# Patient Record
Sex: FEMALE | Race: WHITE | NOT HISPANIC OR LATINO | Employment: FULL TIME | URBAN - METROPOLITAN AREA
[De-identification: names, ages, dates, MRNs, and addresses within clinical notes are randomized per-mention and may not be internally consistent; named-entity substitution may affect disease eponyms.]

---

## 2018-01-02 LAB
EXTERNAL ANTIBODY SCREEN: NEGATIVE
EXTERNAL GROUP B STREP ANTIGEN: NORMAL
EXTERNAL GTT 1 HOUR: 107
EXTERNAL HEPATITIS B SURFACE ANTIGEN: NEGATIVE
EXTERNAL HEPATITIS C AB: NORMAL
EXTERNAL RUBELLA QUALITATIVE: NORMAL
EXTERNAL SYPHILIS RPR SCREEN: NORMAL
HIV1 P24 AG SERPL QL IA: NORMAL

## 2018-08-07 ENCOUNTER — ANESTHESIA (OUTPATIENT)
Dept: LABOR AND DELIVERY | Facility: HOSPITAL | Age: 33
End: 2018-08-07

## 2018-08-07 ENCOUNTER — HOSPITAL ENCOUNTER (INPATIENT)
Facility: HOSPITAL | Age: 33
LOS: 3 days | Discharge: HOME | End: 2018-08-10
Attending: OBSTETRICS & GYNECOLOGY | Admitting: OBSTETRICS & GYNECOLOGY

## 2018-08-07 ENCOUNTER — ANESTHESIA EVENT (OUTPATIENT)
Dept: LABOR AND DELIVERY | Facility: HOSPITAL | Age: 33
End: 2018-08-07

## 2018-08-07 PROBLEM — Z34.90 PREGNANCY: Status: ACTIVE | Noted: 2018-08-07

## 2018-08-07 LAB
ABO GROUP BLD: NORMAL
BASOPHILS # BLD AUTO: 0.03 10*3/MM3 (ref 0–0.2)
BASOPHILS NFR BLD AUTO: 0.2 % (ref 0–1.5)
BLD GP AB SCN SERPL QL: POSITIVE
DEPRECATED RDW RBC AUTO: 44.6 FL (ref 37–54)
EOSINOPHIL # BLD AUTO: 0.06 10*3/MM3 (ref 0–0.7)
EOSINOPHIL NFR BLD AUTO: 0.5 % (ref 0.3–6.2)
ERYTHROCYTE [DISTWIDTH] IN BLOOD BY AUTOMATED COUNT: 12.9 % (ref 11.7–13)
HCT VFR BLD AUTO: 37.1 % (ref 35.6–45.5)
HGB BLD-MCNC: 12.4 G/DL (ref 11.9–15.5)
IMM GRANULOCYTES # BLD: 0.16 10*3/MM3 (ref 0–0.03)
IMM GRANULOCYTES NFR BLD: 1.3 % (ref 0–0.5)
LYMPHOCYTES # BLD AUTO: 1.65 10*3/MM3 (ref 0.9–4.8)
LYMPHOCYTES NFR BLD AUTO: 13.3 % (ref 19.6–45.3)
MCH RBC QN AUTO: 32 PG (ref 26.9–32)
MCHC RBC AUTO-ENTMCNC: 33.4 G/DL (ref 32.4–36.3)
MCV RBC AUTO: 95.6 FL (ref 80.5–98.2)
MONOCYTES # BLD AUTO: 1.15 10*3/MM3 (ref 0.2–1.2)
MONOCYTES NFR BLD AUTO: 9.2 % (ref 5–12)
NEUTROPHILS # BLD AUTO: 9.39 10*3/MM3 (ref 1.9–8.1)
NEUTROPHILS NFR BLD AUTO: 75.5 % (ref 42.7–76)
PLATELET # BLD AUTO: 159 10*3/MM3 (ref 140–500)
PMV BLD AUTO: 10.7 FL (ref 6–12)
RBC # BLD AUTO: 3.88 10*6/MM3 (ref 3.9–5.2)
RESIDUAL RHIG DETECTED: NORMAL
RH BLD: NEGATIVE
T&S EXPIRATION DATE: NORMAL
WBC NRBC COR # BLD: 12.44 10*3/MM3 (ref 4.5–10.7)

## 2018-08-07 PROCEDURE — 86901 BLOOD TYPING SEROLOGIC RH(D): CPT | Performed by: OBSTETRICS & GYNECOLOGY

## 2018-08-07 PROCEDURE — 86850 RBC ANTIBODY SCREEN: CPT | Performed by: OBSTETRICS & GYNECOLOGY

## 2018-08-07 PROCEDURE — 86900 BLOOD TYPING SEROLOGIC ABO: CPT | Performed by: OBSTETRICS & GYNECOLOGY

## 2018-08-07 PROCEDURE — 3E033VJ INTRODUCTION OF OTHER HORMONE INTO PERIPHERAL VEIN, PERCUTANEOUS APPROACH: ICD-10-PCS | Performed by: OBSTETRICS & GYNECOLOGY

## 2018-08-07 PROCEDURE — 86870 RBC ANTIBODY IDENTIFICATION: CPT | Performed by: OBSTETRICS & GYNECOLOGY

## 2018-08-07 PROCEDURE — 85025 COMPLETE CBC W/AUTO DIFF WBC: CPT | Performed by: OBSTETRICS & GYNECOLOGY

## 2018-08-07 RX ORDER — ACETAMINOPHEN 650 MG/1
650 SUPPOSITORY RECTAL EVERY 4 HOURS PRN
Status: CANCELLED | OUTPATIENT
Start: 2018-08-07

## 2018-08-07 RX ORDER — MINERAL OIL
OIL (ML) MISCELLANEOUS ONCE
Status: COMPLETED | OUTPATIENT
Start: 2018-08-07 | End: 2018-08-08

## 2018-08-07 RX ORDER — PROMETHAZINE HYDROCHLORIDE 25 MG/1
12.5 TABLET ORAL EVERY 6 HOURS PRN
Status: CANCELLED | OUTPATIENT
Start: 2018-08-07

## 2018-08-07 RX ORDER — OXYCODONE HYDROCHLORIDE AND ACETAMINOPHEN 5; 325 MG/1; MG/1
2 TABLET ORAL EVERY 4 HOURS PRN
Status: DISCONTINUED | OUTPATIENT
Start: 2018-08-07 | End: 2018-08-08 | Stop reason: HOSPADM

## 2018-08-07 RX ORDER — ONDANSETRON 4 MG/1
4 TABLET, FILM COATED ORAL EVERY 6 HOURS PRN
Status: DISCONTINUED | OUTPATIENT
Start: 2018-08-07 | End: 2018-08-08 | Stop reason: HOSPADM

## 2018-08-07 RX ORDER — METHYLERGONOVINE MALEATE 0.2 MG/ML
200 INJECTION INTRAVENOUS ONCE AS NEEDED
Status: CANCELLED | OUTPATIENT
Start: 2018-08-07

## 2018-08-07 RX ORDER — ONDANSETRON 4 MG/1
4 TABLET, FILM COATED ORAL EVERY 6 HOURS PRN
Status: CANCELLED | OUTPATIENT
Start: 2018-08-07

## 2018-08-07 RX ORDER — MINERAL OIL
30 OIL (ML) MISCELLANEOUS AS NEEDED
Status: CANCELLED | OUTPATIENT
Start: 2018-08-07

## 2018-08-07 RX ORDER — ONDANSETRON 4 MG/1
4 TABLET, ORALLY DISINTEGRATING ORAL EVERY 6 HOURS PRN
Status: CANCELLED | OUTPATIENT
Start: 2018-08-07

## 2018-08-07 RX ORDER — ZOLPIDEM TARTRATE 5 MG/1
5 TABLET ORAL NIGHTLY PRN
Status: CANCELLED | OUTPATIENT
Start: 2018-08-07 | End: 2018-08-17

## 2018-08-07 RX ORDER — FOLIC ACID 5 MG
1 CAPSULE ORAL
COMMUNITY
End: 2018-08-10 | Stop reason: HOSPADM

## 2018-08-07 RX ORDER — IBUPROFEN 600 MG/1
600 TABLET ORAL EVERY 6 HOURS PRN
Status: CANCELLED | OUTPATIENT
Start: 2018-08-07

## 2018-08-07 RX ORDER — SODIUM CHLORIDE 0.9 % (FLUSH) 0.9 %
1-10 SYRINGE (ML) INJECTION AS NEEDED
Status: DISCONTINUED | OUTPATIENT
Start: 2018-08-07 | End: 2018-08-08 | Stop reason: HOSPADM

## 2018-08-07 RX ORDER — ZOLPIDEM TARTRATE 5 MG/1
5 TABLET ORAL NIGHTLY PRN
Status: DISCONTINUED | OUTPATIENT
Start: 2018-08-07 | End: 2018-08-08 | Stop reason: HOSPADM

## 2018-08-07 RX ORDER — PRENATAL VIT NO.126/IRON/FOLIC 28MG-0.8MG
1 TABLET ORAL DAILY
COMMUNITY

## 2018-08-07 RX ORDER — ONDANSETRON 2 MG/ML
4 INJECTION INTRAMUSCULAR; INTRAVENOUS EVERY 6 HOURS PRN
Status: DISCONTINUED | OUTPATIENT
Start: 2018-08-07 | End: 2018-08-08 | Stop reason: HOSPADM

## 2018-08-07 RX ORDER — DIPHENHYDRAMINE HYDROCHLORIDE 50 MG/ML
25 INJECTION INTRAMUSCULAR; INTRAVENOUS NIGHTLY PRN
Status: DISCONTINUED | OUTPATIENT
Start: 2018-08-07 | End: 2018-08-08 | Stop reason: HOSPADM

## 2018-08-07 RX ORDER — HYDROMORPHONE HYDROCHLORIDE 1 MG/ML
0.5 INJECTION, SOLUTION INTRAMUSCULAR; INTRAVENOUS; SUBCUTANEOUS
Status: CANCELLED | OUTPATIENT
Start: 2018-08-07 | End: 2018-08-17

## 2018-08-07 RX ORDER — OXYCODONE HYDROCHLORIDE AND ACETAMINOPHEN 5; 325 MG/1; MG/1
1 TABLET ORAL EVERY 4 HOURS PRN
Status: CANCELLED | OUTPATIENT
Start: 2018-08-07 | End: 2018-08-17

## 2018-08-07 RX ORDER — MISOPROSTOL 200 UG/1
800 TABLET ORAL AS NEEDED
Status: CANCELLED | OUTPATIENT
Start: 2018-08-07

## 2018-08-07 RX ORDER — HYDROMORPHONE HYDROCHLORIDE 1 MG/ML
1 INJECTION, SOLUTION INTRAMUSCULAR; INTRAVENOUS; SUBCUTANEOUS
Status: CANCELLED | OUTPATIENT
Start: 2018-08-07 | End: 2018-08-17

## 2018-08-07 RX ORDER — BACLOFEN 20 MG
2 TABLET ORAL
COMMUNITY

## 2018-08-07 RX ORDER — OXYCODONE HYDROCHLORIDE AND ACETAMINOPHEN 5; 325 MG/1; MG/1
1 TABLET ORAL EVERY 4 HOURS PRN
Status: DISCONTINUED | OUTPATIENT
Start: 2018-08-07 | End: 2018-08-08 | Stop reason: HOSPADM

## 2018-08-07 RX ORDER — ONDANSETRON 4 MG/1
4 TABLET, ORALLY DISINTEGRATING ORAL EVERY 6 HOURS PRN
Status: DISCONTINUED | OUTPATIENT
Start: 2018-08-07 | End: 2018-08-08 | Stop reason: HOSPADM

## 2018-08-07 RX ORDER — ACETAMINOPHEN 500 MG
1000 TABLET ORAL EVERY 4 HOURS PRN
Status: CANCELLED | OUTPATIENT
Start: 2018-08-07

## 2018-08-07 RX ORDER — PROMETHAZINE HYDROCHLORIDE 12.5 MG/1
12.5 SUPPOSITORY RECTAL EVERY 6 HOURS PRN
Status: CANCELLED | OUTPATIENT
Start: 2018-08-07

## 2018-08-07 RX ORDER — OXYCODONE HYDROCHLORIDE AND ACETAMINOPHEN 5; 325 MG/1; MG/1
2 TABLET ORAL EVERY 4 HOURS PRN
Status: CANCELLED | OUTPATIENT
Start: 2018-08-07 | End: 2018-08-17

## 2018-08-07 RX ORDER — DIPHENHYDRAMINE HYDROCHLORIDE 50 MG/ML
25 INJECTION INTRAMUSCULAR; INTRAVENOUS NIGHTLY PRN
Status: CANCELLED | OUTPATIENT
Start: 2018-08-07

## 2018-08-07 RX ORDER — DEXTROSE, SODIUM CHLORIDE, SODIUM LACTATE, POTASSIUM CHLORIDE, AND CALCIUM CHLORIDE 5; .6; .31; .03; .02 G/100ML; G/100ML; G/100ML; G/100ML; G/100ML
125 INJECTION, SOLUTION INTRAVENOUS CONTINUOUS
Status: DISCONTINUED | OUTPATIENT
Start: 2018-08-07 | End: 2018-08-07

## 2018-08-07 RX ORDER — SODIUM CHLORIDE, SODIUM LACTATE, POTASSIUM CHLORIDE, CALCIUM CHLORIDE 600; 310; 30; 20 MG/100ML; MG/100ML; MG/100ML; MG/100ML
125 INJECTION, SOLUTION INTRAVENOUS CONTINUOUS
Status: DISCONTINUED | OUTPATIENT
Start: 2018-08-07 | End: 2018-08-10 | Stop reason: HOSPADM

## 2018-08-07 RX ORDER — DEXTROSE, SODIUM CHLORIDE, SODIUM LACTATE, POTASSIUM CHLORIDE, AND CALCIUM CHLORIDE 5; .6; .31; .03; .02 G/100ML; G/100ML; G/100ML; G/100ML; G/100ML
125 INJECTION, SOLUTION INTRAVENOUS CONTINUOUS
Status: DISCONTINUED | OUTPATIENT
Start: 2018-08-08 | End: 2018-08-10 | Stop reason: HOSPADM

## 2018-08-07 RX ORDER — DIPHENHYDRAMINE HCL 25 MG
25 CAPSULE ORAL NIGHTLY PRN
Status: CANCELLED | OUTPATIENT
Start: 2018-08-07

## 2018-08-07 RX ORDER — PROMETHAZINE HYDROCHLORIDE 25 MG/ML
12.5 INJECTION, SOLUTION INTRAMUSCULAR; INTRAVENOUS EVERY 6 HOURS PRN
Status: CANCELLED | OUTPATIENT
Start: 2018-08-07

## 2018-08-07 RX ORDER — FAMOTIDINE 20 MG/1
20 TABLET, FILM COATED ORAL EVERY 12 HOURS PRN
Status: DISCONTINUED | OUTPATIENT
Start: 2018-08-07 | End: 2018-08-08 | Stop reason: HOSPADM

## 2018-08-07 RX ORDER — ONDANSETRON 2 MG/ML
4 INJECTION INTRAMUSCULAR; INTRAVENOUS EVERY 6 HOURS PRN
Status: CANCELLED | OUTPATIENT
Start: 2018-08-07

## 2018-08-07 RX ORDER — DIPHENHYDRAMINE HCL 25 MG
25 CAPSULE ORAL NIGHTLY PRN
Status: DISCONTINUED | OUTPATIENT
Start: 2018-08-07 | End: 2018-08-08 | Stop reason: HOSPADM

## 2018-08-07 RX ORDER — ACETAMINOPHEN 500 MG
1000 TABLET ORAL EVERY 4 HOURS PRN
Status: DISCONTINUED | OUTPATIENT
Start: 2018-08-07 | End: 2018-08-08 | Stop reason: HOSPADM

## 2018-08-07 RX ORDER — ASPIRIN 81 MG/1
81 TABLET ORAL DAILY
COMMUNITY
End: 2018-08-10 | Stop reason: HOSPADM

## 2018-08-07 RX ORDER — FAMOTIDINE 10 MG/ML
20 INJECTION, SOLUTION INTRAVENOUS EVERY 12 HOURS PRN
Status: DISCONTINUED | OUTPATIENT
Start: 2018-08-07 | End: 2018-08-08 | Stop reason: HOSPADM

## 2018-08-07 RX ORDER — NALBUPHINE HCL 10 MG/ML
5 AMPUL (ML) INJECTION
Status: CANCELLED | OUTPATIENT
Start: 2018-08-07

## 2018-08-07 RX ORDER — TERBUTALINE SULFATE 1 MG/ML
0.25 INJECTION, SOLUTION SUBCUTANEOUS AS NEEDED
Status: DISCONTINUED | OUTPATIENT
Start: 2018-08-07 | End: 2018-08-08 | Stop reason: HOSPADM

## 2018-08-07 RX ORDER — LIDOCAINE HYDROCHLORIDE 10 MG/ML
5 INJECTION, SOLUTION EPIDURAL; INFILTRATION; INTRACAUDAL; PERINEURAL AS NEEDED
Status: DISCONTINUED | OUTPATIENT
Start: 2018-08-07 | End: 2018-08-08 | Stop reason: HOSPADM

## 2018-08-07 RX ORDER — CARBOPROST TROMETHAMINE 250 UG/ML
250 INJECTION, SOLUTION INTRAMUSCULAR AS NEEDED
Status: CANCELLED | OUTPATIENT
Start: 2018-08-07

## 2018-08-07 RX ADMIN — SODIUM CHLORIDE, POTASSIUM CHLORIDE, SODIUM LACTATE AND CALCIUM CHLORIDE 125 ML/HR: 600; 310; 30; 20 INJECTION, SOLUTION INTRAVENOUS at 19:55

## 2018-08-07 RX ADMIN — DINOPROSTONE 10 MG: 10 INSERT VAGINAL at 20:25

## 2018-08-08 LAB
ATMOSPHERIC PRESS: 748 MMHG
BASE EXCESS BLDCOA CALC-SCNC: -3.9 MMOL/L
BDY SITE: ABNORMAL
FETAL BLEED: NEGATIVE
HCO3 BLDCOA-SCNC: 22.6 MMOL/L (ref 22–28)
MODALITY: ABNORMAL
NUMBER OF DOSES: NORMAL
PCO2 BLDCOA: 45.2 MMHG
PH BLDCOA: 7.31 PH UNITS (ref 7.18–7.34)
PO2 BLDCOA: <25.2 MMHG (ref 12–26)
SAO2 % BLDCOA: 19.4 % (ref 92–99)

## 2018-08-08 PROCEDURE — 3E0E3GC INTRODUCTION OF OTHER THERAPEUTIC SUBSTANCE INTO PRODUCTS OF CONCEPTION, PERCUTANEOUS APPROACH: ICD-10-PCS | Performed by: OBSTETRICS & GYNECOLOGY

## 2018-08-08 PROCEDURE — 82803 BLOOD GASES ANY COMBINATION: CPT

## 2018-08-08 PROCEDURE — 86901 BLOOD TYPING SEROLOGIC RH(D): CPT | Performed by: OBSTETRICS & GYNECOLOGY

## 2018-08-08 PROCEDURE — 0KQM0ZZ REPAIR PERINEUM MUSCLE, OPEN APPROACH: ICD-10-PCS | Performed by: OBSTETRICS & GYNECOLOGY

## 2018-08-08 PROCEDURE — 25010000002 BUTORPHANOL PER 1 MG: Performed by: OBSTETRICS & GYNECOLOGY

## 2018-08-08 PROCEDURE — 88307 TISSUE EXAM BY PATHOLOGIST: CPT

## 2018-08-08 PROCEDURE — 85461 HEMOGLOBIN FETAL: CPT | Performed by: OBSTETRICS & GYNECOLOGY

## 2018-08-08 PROCEDURE — C1755 CATHETER, INTRASPINAL: HCPCS | Performed by: ANESTHESIOLOGY

## 2018-08-08 PROCEDURE — 86900 BLOOD TYPING SEROLOGIC ABO: CPT | Performed by: OBSTETRICS & GYNECOLOGY

## 2018-08-08 PROCEDURE — 10907ZC DRAINAGE OF AMNIOTIC FLUID, THERAPEUTIC FROM PRODUCTS OF CONCEPTION, VIA NATURAL OR ARTIFICIAL OPENING: ICD-10-PCS | Performed by: OBSTETRICS & GYNECOLOGY

## 2018-08-08 RX ORDER — PROMETHAZINE HYDROCHLORIDE 25 MG/ML
12.5 INJECTION, SOLUTION INTRAMUSCULAR; INTRAVENOUS EVERY 4 HOURS PRN
Status: DISCONTINUED | OUTPATIENT
Start: 2018-08-08 | End: 2018-08-10 | Stop reason: HOSPADM

## 2018-08-08 RX ORDER — EPHEDRINE SULFATE 50 MG/ML
5 INJECTION, SOLUTION INTRAVENOUS AS NEEDED
Status: DISCONTINUED | OUTPATIENT
Start: 2018-08-08 | End: 2018-08-08 | Stop reason: HOSPADM

## 2018-08-08 RX ORDER — PHYTONADIONE 1 MG/.5ML
INJECTION, EMULSION INTRAMUSCULAR; INTRAVENOUS; SUBCUTANEOUS
Status: DISPENSED
Start: 2018-08-08 | End: 2018-08-09

## 2018-08-08 RX ORDER — ONDANSETRON 2 MG/ML
4 INJECTION INTRAMUSCULAR; INTRAVENOUS EVERY 6 HOURS PRN
Status: DISCONTINUED | OUTPATIENT
Start: 2018-08-08 | End: 2018-08-10 | Stop reason: HOSPADM

## 2018-08-08 RX ORDER — OXYTOCIN-SODIUM CHLORIDE 0.9% IV SOLN 30 UNIT/500ML 30-0.9/5 UT/ML-%
2-20 SOLUTION INTRAVENOUS
Status: DISCONTINUED | OUTPATIENT
Start: 2018-08-08 | End: 2018-08-10 | Stop reason: HOSPADM

## 2018-08-08 RX ORDER — PROMETHAZINE HYDROCHLORIDE 25 MG/1
25 TABLET ORAL EVERY 6 HOURS PRN
Status: DISCONTINUED | OUTPATIENT
Start: 2018-08-08 | End: 2018-08-10 | Stop reason: HOSPADM

## 2018-08-08 RX ORDER — LIDOCAINE HYDROCHLORIDE 15 MG/ML
INJECTION, SOLUTION EPIDURAL; INFILTRATION; INTRACAUDAL; PERINEURAL AS NEEDED
Status: DISCONTINUED | OUTPATIENT
Start: 2018-08-08 | End: 2018-08-08 | Stop reason: SURG

## 2018-08-08 RX ORDER — OXYTOCIN-SODIUM CHLORIDE 0.9% IV SOLN 30 UNIT/500ML 30-0.9/5 UT/ML-%
999 SOLUTION INTRAVENOUS ONCE
Status: COMPLETED | OUTPATIENT
Start: 2018-08-08 | End: 2018-08-08

## 2018-08-08 RX ORDER — BUTORPHANOL TARTRATE 1 MG/ML
1 INJECTION, SOLUTION INTRAMUSCULAR; INTRAVENOUS
Status: DISCONTINUED | OUTPATIENT
Start: 2018-08-08 | End: 2018-08-10 | Stop reason: HOSPADM

## 2018-08-08 RX ORDER — ONDANSETRON 2 MG/ML
4 INJECTION INTRAMUSCULAR; INTRAVENOUS ONCE AS NEEDED
Status: DISCONTINUED | OUTPATIENT
Start: 2018-08-08 | End: 2018-08-08 | Stop reason: HOSPADM

## 2018-08-08 RX ORDER — OXYTOCIN-SODIUM CHLORIDE 0.9% IV SOLN 30 UNIT/500ML 30-0.9/5 UT/ML-%
250 SOLUTION INTRAVENOUS CONTINUOUS
Status: ACTIVE | OUTPATIENT
Start: 2018-08-08 | End: 2018-08-08

## 2018-08-08 RX ORDER — BISACODYL 10 MG
10 SUPPOSITORY, RECTAL RECTAL DAILY PRN
Status: DISCONTINUED | OUTPATIENT
Start: 2018-08-09 | End: 2018-08-10 | Stop reason: HOSPADM

## 2018-08-08 RX ORDER — SODIUM CHLORIDE 0.9 % (FLUSH) 0.9 %
1-10 SYRINGE (ML) INJECTION AS NEEDED
Status: DISCONTINUED | OUTPATIENT
Start: 2018-08-08 | End: 2018-08-10 | Stop reason: HOSPADM

## 2018-08-08 RX ORDER — IBUPROFEN 800 MG/1
800 TABLET ORAL EVERY 8 HOURS
Status: DISCONTINUED | OUTPATIENT
Start: 2018-08-09 | End: 2018-08-10 | Stop reason: HOSPADM

## 2018-08-08 RX ORDER — PROMETHAZINE HYDROCHLORIDE 25 MG/ML
12.5 INJECTION, SOLUTION INTRAMUSCULAR; INTRAVENOUS EVERY 6 HOURS PRN
Status: DISCONTINUED | OUTPATIENT
Start: 2018-08-08 | End: 2018-08-10 | Stop reason: HOSPADM

## 2018-08-08 RX ORDER — LANOLIN 100 %
OINTMENT (GRAM) TOPICAL
Status: DISCONTINUED | OUTPATIENT
Start: 2018-08-08 | End: 2018-08-10 | Stop reason: HOSPADM

## 2018-08-08 RX ORDER — OXYCODONE HYDROCHLORIDE AND ACETAMINOPHEN 5; 325 MG/1; MG/1
2 TABLET ORAL
Status: DISCONTINUED | OUTPATIENT
Start: 2018-08-08 | End: 2018-08-10 | Stop reason: HOSPADM

## 2018-08-08 RX ORDER — ZOLPIDEM TARTRATE 5 MG/1
5 TABLET ORAL NIGHTLY PRN
Status: DISCONTINUED | OUTPATIENT
Start: 2018-08-08 | End: 2018-08-10 | Stop reason: HOSPADM

## 2018-08-08 RX ORDER — DIPHENHYDRAMINE HYDROCHLORIDE 50 MG/ML
12.5 INJECTION INTRAMUSCULAR; INTRAVENOUS EVERY 8 HOURS PRN
Status: DISCONTINUED | OUTPATIENT
Start: 2018-08-08 | End: 2018-08-08 | Stop reason: HOSPADM

## 2018-08-08 RX ORDER — DOCUSATE SODIUM 100 MG/1
100 CAPSULE, LIQUID FILLED ORAL 2 TIMES DAILY PRN
Status: DISCONTINUED | OUTPATIENT
Start: 2018-08-08 | End: 2018-08-10 | Stop reason: HOSPADM

## 2018-08-08 RX ORDER — ONDANSETRON 4 MG/1
4 TABLET, FILM COATED ORAL EVERY 6 HOURS PRN
Status: DISCONTINUED | OUTPATIENT
Start: 2018-08-08 | End: 2018-08-10 | Stop reason: HOSPADM

## 2018-08-08 RX ORDER — FAMOTIDINE 10 MG/ML
20 INJECTION, SOLUTION INTRAVENOUS ONCE AS NEEDED
Status: DISCONTINUED | OUTPATIENT
Start: 2018-08-08 | End: 2018-08-08 | Stop reason: HOSPADM

## 2018-08-08 RX ORDER — OXYCODONE HYDROCHLORIDE AND ACETAMINOPHEN 5; 325 MG/1; MG/1
1 TABLET ORAL
Status: DISCONTINUED | OUTPATIENT
Start: 2018-08-08 | End: 2018-08-10 | Stop reason: HOSPADM

## 2018-08-08 RX ORDER — ACETAMINOPHEN 325 MG/1
650 TABLET ORAL EVERY 4 HOURS PRN
Status: DISCONTINUED | OUTPATIENT
Start: 2018-08-08 | End: 2018-08-10 | Stop reason: HOSPADM

## 2018-08-08 RX ORDER — OXYTOCIN-SODIUM CHLORIDE 0.9% IV SOLN 30 UNIT/500ML 30-0.9/5 UT/ML-%
125 SOLUTION INTRAVENOUS CONTINUOUS PRN
Status: COMPLETED | OUTPATIENT
Start: 2018-08-08 | End: 2018-08-08

## 2018-08-08 RX ORDER — ONDANSETRON 4 MG/1
4 TABLET, ORALLY DISINTEGRATING ORAL EVERY 6 HOURS PRN
Status: DISCONTINUED | OUTPATIENT
Start: 2018-08-08 | End: 2018-08-10 | Stop reason: HOSPADM

## 2018-08-08 RX ORDER — SODIUM CHLORIDE 9 MG/ML
100 INJECTION, SOLUTION INTRAVENOUS CONTINUOUS
Status: DISCONTINUED | OUTPATIENT
Start: 2018-08-08 | End: 2018-08-10 | Stop reason: HOSPADM

## 2018-08-08 RX ORDER — PROMETHAZINE HYDROCHLORIDE 25 MG/1
12.5 SUPPOSITORY RECTAL EVERY 6 HOURS PRN
Status: DISCONTINUED | OUTPATIENT
Start: 2018-08-08 | End: 2018-08-10 | Stop reason: HOSPADM

## 2018-08-08 RX ORDER — ERYTHROMYCIN 5 MG/G
OINTMENT OPHTHALMIC
Status: DISPENSED
Start: 2018-08-08 | End: 2018-08-09

## 2018-08-08 RX ADMIN — OXYTOCIN 999 ML/HR: 10 INJECTION, SOLUTION INTRAMUSCULAR; INTRAVENOUS at 18:16

## 2018-08-08 RX ADMIN — LIDOCAINE HYDROCHLORIDE 3 ML: 15 INJECTION, SOLUTION EPIDURAL; INFILTRATION; INTRACAUDAL; PERINEURAL at 10:46

## 2018-08-08 RX ADMIN — BUTORPHANOL TARTRATE 2 MG: 2 INJECTION, SOLUTION INTRAMUSCULAR; INTRAVENOUS at 05:32

## 2018-08-08 RX ADMIN — LIDOCAINE HYDROCHLORIDE 2 ML: 15 INJECTION, SOLUTION EPIDURAL; INFILTRATION; INTRACAUDAL; PERINEURAL at 10:50

## 2018-08-08 RX ADMIN — SODIUM CHLORIDE, POTASSIUM CHLORIDE, SODIUM LACTATE AND CALCIUM CHLORIDE 500 ML: 600; 310; 30; 20 INJECTION, SOLUTION INTRAVENOUS at 20:42

## 2018-08-08 RX ADMIN — SODIUM CHLORIDE, SODIUM LACTATE, POTASSIUM CHLORIDE, CALCIUM CHLORIDE AND DEXTROSE MONOHYDRATE 125 ML/HR: 5; 600; 310; 30; 20 INJECTION, SOLUTION INTRAVENOUS at 03:56

## 2018-08-08 RX ADMIN — Medication: at 18:00

## 2018-08-08 RX ADMIN — ACETAMINOPHEN 650 MG: 325 TABLET, FILM COATED ORAL at 22:24

## 2018-08-08 RX ADMIN — IBUPROFEN 800 MG: 800 TABLET ORAL at 22:24

## 2018-08-08 RX ADMIN — OXYTOCIN 125 ML/HR: 10 INJECTION, SOLUTION INTRAMUSCULAR; INTRAVENOUS at 19:37

## 2018-08-08 RX ADMIN — OXYTOCIN 250 ML/HR: 10 INJECTION, SOLUTION INTRAMUSCULAR; INTRAVENOUS at 18:33

## 2018-08-08 RX ADMIN — SODIUM CHLORIDE, POTASSIUM CHLORIDE, SODIUM LACTATE AND CALCIUM CHLORIDE 125 ML/HR: 600; 310; 30; 20 INJECTION, SOLUTION INTRAVENOUS at 12:50

## 2018-08-08 RX ADMIN — Medication 10 ML/HR: at 10:56

## 2018-08-08 RX ADMIN — SODIUM CHLORIDE, POTASSIUM CHLORIDE, SODIUM LACTATE AND CALCIUM CHLORIDE 1000 ML: 600; 310; 30; 20 INJECTION, SOLUTION INTRAVENOUS at 10:28

## 2018-08-08 RX ADMIN — OXYTOCIN 2 MILLI-UNITS/MIN: 10 INJECTION, SOLUTION INTRAMUSCULAR; INTRAVENOUS at 10:16

## 2018-08-08 RX ADMIN — LIDOCAINE HYDROCHLORIDE 3 ML: 15 INJECTION, SOLUTION EPIDURAL; INFILTRATION; INTRACAUDAL; PERINEURAL at 10:48

## 2018-08-08 NOTE — ANESTHESIA PREPROCEDURE EVALUATION
Anesthesia Evaluation     Patient summary reviewed and Nursing notes reviewed                Airway   Dental      Pulmonary - negative pulmonary ROS   Cardiovascular - negative cardio ROS        Neuro/Psych- negative ROS  GI/Hepatic/Renal/Endo - negative ROS     Musculoskeletal (-) negative ROS    Abdominal    Substance History - negative use     OB/GYN    (+) Pregnant,         Other - negative ROS       ROS/Med Hx Other: scoliosis                Anesthesia Plan    ASA 2     epidural     intravenous induction   Anesthetic plan and risks discussed with patient.

## 2018-08-08 NOTE — PLAN OF CARE
Problem: Patient Care Overview  Goal: Plan of Care Review  Outcome: Ongoing (interventions implemented as appropriate)   18   Coping/Psychosocial   Plan of Care Reviewed With patient   Plan of Care Review   Progress improving   OTHER   Outcome Summary mom had vaginal birth after 2.5 hours of pushing to healthy little girl. 2nd degree laceration, 250 ebl. no pain at the time of shift report. bleeding WDL. Mom able to breastfeed and do AIMEE in recovery     Goal: Individualization and Mutuality  Outcome: Ongoing (interventions implemented as appropriate)    Goal: Discharge Needs Assessment  Outcome: Ongoing (interventions implemented as appropriate)      Problem: Anesthesia/Analgesia, Neuraxial (Obstetrics)  Goal: Signs and Symptoms of Listed Potential Problems Will be Absent, Minimized or Managed (Anesthesia/Analgesia, Neuraxial)  Outcome: Ongoing (interventions implemented as appropriate)   18   Goal/Outcome Evaluation   Problems Assessed (Neuraxial Anesthesia/Analgesia, OB) all   Problems Present (Neuraxial Anesth OB) none       Problem: Postpartum (Vaginal Delivery) (Adult,Obstetrics,Pediatric)  Goal: Signs and Symptoms of Listed Potential Problems Will be Absent, Minimized or Managed (Postpartum)  Outcome: Ongoing (interventions implemented as appropriate)   18   Goal/Outcome Evaluation   Problems Assessed (Postpartum Vaginal Delivery) all   Problems Present (Postpartum Vag Deliv) none       Problem: Fall Risk,  (Adult,Obstetrics,Pediatric)  Goal: Identify Related Risk Factors and Signs and Symptoms  Outcome: Ongoing (interventions implemented as appropriate)   18   Fall Risk,  (Adult,Obstetrics,Pediatric)   Related Risk Factors (Fall Risk, ) significant blood loss;regional anesthesia;prolonged bed rest     Goal: Absence of Maternal Fall  Outcome: Ongoing (interventions implemented as appropriate)   18   Fall Risk,   (Adult,Obstetrics,Pediatric)   Absence of Maternal Fall achieves outcome     Goal: Absence of  Fall/Drop  Outcome: Ongoing (interventions implemented as appropriate)   18   Fall Risk,  (Adult,Obstetrics,Pediatric)   Absence of  Fall/Drop achieves outcome       Problem: Skin Injury Risk (Adult)  Goal: Identify Related Risk Factors and Signs and Symptoms  Outcome: Ongoing (interventions implemented as appropriate)   18   Skin Injury Risk (Adult)   Related Risk Factors (Skin Injury Risk) medication;mobility impaired;moisture     Goal: Skin Health and Integrity  Outcome: Ongoing (interventions implemented as appropriate)   18   Skin Injury Risk (Adult)   Skin Health and Integrity achieves outcome

## 2018-08-08 NOTE — L&D DELIVERY NOTE
[facility]  Vaginal Delivery Note    Delivery    Laura Quinonez33 y.o.  at 39w4d    Induction: Misoprostol;Oxytocin   Induction indication:     Cervical ripenin2018  8:25 PM   Augmentation: Artificial rupture of membranes/amniotomy   Labor onset:2018  9:10 AM   Dilation complete: 2018  2:50 PM   Beginning of second stage: 2018  3:40 PM     Antibiotics received during labor: No            Delivery: Vaginal, Spontaneous Delivery     YOB: 2018    Time of Birth: 6:13 PM      Anesthesia: Epidural     Delivering clinician: Angy Gilbert MD       Infant    Findings: Viable female  infant    Infant observations: Weight: 3471 g (7 lb 10.4 oz)    Observations/Comments:  scale 4      Apgars: 8   @ 1 minute /    9   @ 5 minutes     Placenta, Cord, and Fluid    Placenta delivered  Spontaneous   at  2018  6:16 PM     Cord: 3 vessels  present.   Cord blood obtained: Yes    Cord gases obtained:  Yes      Repair    Episiotomy: none   Lacerations: 2nd   Estimated Blood Loss: 250  mls.       Delivery narrative: The patient is a 33 y.o.  at 39w4d.  Cervidil /pit induction for polyhydramnios. Cervidil for 12 hrs. Membrane rupture/fluid: artificial rupture of membranes  at 9:10 AM  on 2018  Clear - large amount. Pit only to 4 mu max.  She progressed appropriately in labor to c/c/+2.  FHR were overall reassuring without persistent worrisome decelerations. Amnioinfusion for intermittent variables.  SheProgressed to complete at 2:50 PM . Labored down until 2018  3:40 PM . She pushed 2 1/2 hrs and had a   of a  3471 g (7 lb 10.4 oz)  female   infant   8   @ 1 minute /   9   @ 5 minutes. Tight nuchal that was reduced. The left shoulder was the anterior shoulder and easily delivered. Arterial was pH sent.    Placenta was spontaneously delivered,3 vessel cord, intact. . Cervix and rectum intact. There was a  2nd degree laceration repaired in usual fashion with vicryl suture. EBL was  250  mls. There were no complications. Mother and baby doing well at time of dictation.    Placenta to pathology- polyhydramnios    Angy Gilbert MD  08/08/18  9:11 PM    Delivery note completed now-  too soon after delivery that nursing info not yet entered. Refreshing later so missing delivery demographics recorded.

## 2018-08-08 NOTE — PLAN OF CARE
Problem: Patient Care Overview  Goal: Plan of Care Review  Outcome: Ongoing (interventions implemented as appropriate)   08/08/18 1948 08/08/18 1953   Coping/Psychosocial   Plan of Care Reviewed With --  patient;spouse   Plan of Care Review   Progress improving --    OTHER   Outcome Summary mom had vaginal birth after 2.5 hours of pushing to healthy little girl. 2nd degree laceration, 250 ebl. no pain at the time of shift report. bleeding WDL. Mom able to breastfeed and do AIMEE in recovery --      Goal: Individualization and Mutuality  Outcome: Ongoing (interventions implemented as appropriate)   08/08/18 1948   Individualization   Patient Specific Preferences aimee, breastfeed,  to cut cord   Patient Specific Goals (Include Timeframe) healthy mom and baby, pain control   Patient Specific Interventions position changes, pitocin, iupc, amnioinfusion   Mutuality/Individual Preferences   What Anxieties, Fears, Concerns, or Questions Do You Have About Your Care? general anxiety about being 1st time parent   What Information Would Help Us Give You More Personalized Care? none   How Would You and/or Your Support Person Like to Participate in Your Care? none   Mutuality/Individual Preferences   How to Address Anxieties/Fears discuss POC with patien and family     Goal: Discharge Needs Assessment  Outcome: Ongoing (interventions implemented as appropriate)   08/08/18 1953   Discharge Needs Assessment   Readmission Within the Last 30 Days no previous admission in last 30 days   Concerns to be Addressed no discharge needs identified   Patient/Family Anticipates Transition to home with family   Patient/Family Anticipated Services at Transition none   Transportation Concerns car, none   Transportation Anticipated car, drives self   Anticipated Changes Related to Illness none   Equipment Needed After Discharge none   Offered/Gave Vendor List no   Disability   Equipment Currently Used at Home none     Goal: Interprofessional  Rounds/Family Conf  Outcome: Ongoing (interventions implemented as appropriate)   18   Interdisciplinary Rounds/Family Conf   Participants nursing;physician       Problem: Anesthesia/Analgesia, Neuraxial (Obstetrics)  Goal: Signs and Symptoms of Listed Potential Problems Will be Absent, Minimized or Managed (Anesthesia/Analgesia, Neuraxial)  Outcome: Ongoing (interventions implemented as appropriate)   18   Goal/Outcome Evaluation   Problems Assessed (Neuraxial Anesthesia/Analgesia, OB) all   Problems Present (Neuraxial Anesth OB) none       Problem: Postpartum (Vaginal Delivery) (Adult,Obstetrics,Pediatric)  Goal: Signs and Symptoms of Listed Potential Problems Will be Absent, Minimized or Managed (Postpartum)  Outcome: Ongoing (interventions implemented as appropriate)   18   Goal/Outcome Evaluation   Problems Assessed (Postpartum Vaginal Delivery) all   Problems Present (Postpartum Vag Deliv) none       Problem: Fall Risk,  (Adult,Obstetrics,Pediatric)  Goal: Identify Related Risk Factors and Signs and Symptoms  Outcome: Ongoing (interventions implemented as appropriate)   18   Fall Risk,  (Adult,Obstetrics,Pediatric)   Related Risk Factors (Fall Risk, ) medication side effects   Signs and Symptoms (Fall Risk, ) presence of fall risk factors     Goal: Absence of Maternal Fall  Outcome: Ongoing (interventions implemented as appropriate)   18   Fall Risk,  (Adult,Obstetrics,Pediatric)   Absence of Maternal Fall making progress toward outcome     Goal: Absence of  Fall/Drop  Outcome: Ongoing (interventions implemented as appropriate)   18   Fall Risk,  (Adult,Obstetrics,Pediatric)   Absence of [place] Fall/Drop making progress toward outcome       Problem: Skin Injury Risk (Adult)  Goal: Identify Related Risk Factors and Signs and Symptoms  Outcome: Ongoing (interventions implemented as  appropriate)   08/08/18 1953   Skin Injury Risk (Adult)   Related Risk Factors (Skin Injury Risk) medication     Goal: Skin Health and Integrity  Outcome: Ongoing (interventions implemented as appropriate)   08/08/18 1953   Skin Injury Risk (Adult)   Skin Health and Integrity making progress toward outcome

## 2018-08-08 NOTE — H&P
.[facility]  Obstetric History and Physical    Chief Complaint   Patient presents with   • Scheduled Induction     pt rpt +FM; denies LOF, VB, or contractions. pt beinf induced for polyhydramnios       Subjective     Patient is a 33 y.o. female  currently at 39w4d, who is cervidil/ pit induction due to       Polyhydramnios.      Past OB History:       Obstetric History       T0      L0     SAB0   TAB0   Ectopic0   Molar0   Multiple0   Live Births0       # Outcome Date GA Lbr Rian/2nd Weight Sex Delivery Anes PTL Lv   1 Current                   Past Medical History: Past Medical History:   Diagnosis Date   • Abnormal Pap smear of cervix     ascus   • Breast carcinoma (CMS/HCC)     age 21   • MTHFR mutation (CMS/HCC)    • Scoliosis, adolescent acquired     no treatment      Past Surgical History Past Surgical History:   Procedure Laterality Date   • BREAST AUGMENTATION      age 21   • BREAST LUMPECTOMY     • BREAST RECONSTRUCTION      age 21   • MASTECTOMY     • WISDOM TOOTH EXTRACTION        Family History: Family History   Problem Relation Age of Onset   • Diabetes Father    • Osteoporosis Mother       Social History:  reports that she has never smoked. She has never used smokeless tobacco.   reports that she does not drink alcohol.   reports that she does not use drugs.    Allergies:     Patient has no known allergies.       Objective       Vital Signs Range for the last 24 hours  Temperature: Temp:  [98 °F (36.7 °C)-98.4 °F (36.9 °C)] 98.1 °F (36.7 °C)   Temp Source: Temp src: Oral   BP: BP: ()/(50-82) 130/82   Pulse: Heart Rate:  [65-96] 69   Respirations: Resp:  [18] 18                   Physical Examination:     General :  Alert in NAD  Abdomen: Gravid, nontender        Cervix: Exam by: Method: sterile exam per physician   Dilation: Cervical Dilation (cm): 2   Effacement: Cervical Effacement: 60%   Station: Fetal Station: -2       Fetal Heart Rate Assessment   Method: Fetal HR  Assessment Method: external   Beats/min: Fetal HR (beats/min): 125   Baseline: Fetal Heart Baseline Rate: normal range   Varibility: Fetal HR Variability: moderate (amplitude range 6 to 25 bpm) (min to mod)   Accels: Fetal HR Accelerations: greater than/equal to 15 bpm, lasting at least 15 seconds   Decels: Fetal HR Decelerations: absent   Tracing Category:       Uterine Assessment   Method: Method: external tocotransducer, palpation   Frequency (min): Contraction Frequency (Minutes): 1-3   Ctx Count in 10 min:     Duration:     Intensity: Contraction Intensity: mild by palpation   Intensity by IUPC:     Resting Tone: Uterine Resting Tone: soft by palpation   Resting Tone by IUPC:     Roberta Units:         S/p cervidil for 12 hrs. Removed.    cvx 2/660/-2/mid -- AROM - large amt clear fluid      Assessment/Plan       Assessment:  1.  Intrauterine pregnancy at 39w4d weeks gestation with reassuring fetal status.    2.  Cervidil/pit induction. polyhydramnios    Plan:   Plan of care has been reviewed with patient and family,.   All questions answered.          Office prenatal reviewed        Angy Gilbert MD  8/8/2018  9:37 AM

## 2018-08-08 NOTE — ANESTHESIA PROCEDURE NOTES
Labor Epidural    Patient location during procedure: OB  Performed By  Anesthesiologist: ALBERTINA CRAIN  Preanesthetic Checklist  Completed: patient identified, site marked, surgical consent, pre-op evaluation, timeout performed, IV checked, risks and benefits discussed and monitors and equipment checked  Prep:  Pt Position:sitting  Sterile Tech:cap, gloves, mask and sterile barrier  Prep:chlorhexidine gluconate and isopropyl alcohol  Monitoring:blood pressure monitoring, continuous pulse oximetry and EKG  Epidural Block Procedure:  Approach:midline  Guidance:landmark technique and palpation technique  Location:L3-L4  Needle Type:Tuohy  Needle Gauge:17  Loss of Resistance Medium: air  Cath Depth at skin:5 cm  Paresthesia: none  Aspiration:negative  Test Dose:negative  Number of Attempts: 1  Post Assessment:  Dressing:occlusive dressing applied and secured with tape  Pt Tolerance:patient tolerated the procedure well with no apparent complications  Complications:no

## 2018-08-09 LAB
ABO GROUP BLD: NORMAL
BASOPHILS # BLD AUTO: 0.02 10*3/MM3 (ref 0–0.2)
BASOPHILS NFR BLD AUTO: 0.1 % (ref 0–1.5)
DEPRECATED RDW RBC AUTO: 44.8 FL (ref 37–54)
EOSINOPHIL # BLD AUTO: 0.05 10*3/MM3 (ref 0–0.7)
EOSINOPHIL NFR BLD AUTO: 0.2 % (ref 0.3–6.2)
ERYTHROCYTE [DISTWIDTH] IN BLOOD BY AUTOMATED COUNT: 13.1 % (ref 11.7–13)
HCT VFR BLD AUTO: 29.1 % (ref 35.6–45.5)
HGB BLD-MCNC: 10 G/DL (ref 11.9–15.5)
IMM GRANULOCYTES # BLD: 0.17 10*3/MM3 (ref 0–0.03)
IMM GRANULOCYTES NFR BLD: 0.8 % (ref 0–0.5)
LYMPHOCYTES # BLD AUTO: 1.53 10*3/MM3 (ref 0.9–4.8)
LYMPHOCYTES NFR BLD AUTO: 7 % (ref 19.6–45.3)
MCH RBC QN AUTO: 32.7 PG (ref 26.9–32)
MCHC RBC AUTO-ENTMCNC: 34.4 G/DL (ref 32.4–36.3)
MCV RBC AUTO: 95.1 FL (ref 80.5–98.2)
MONOCYTES # BLD AUTO: 1.48 10*3/MM3 (ref 0.2–1.2)
MONOCYTES NFR BLD AUTO: 6.8 % (ref 5–12)
NEUTROPHILS # BLD AUTO: 18.83 10*3/MM3 (ref 1.9–8.1)
NEUTROPHILS NFR BLD AUTO: 85.9 % (ref 42.7–76)
PLATELET # BLD AUTO: 133 10*3/MM3 (ref 140–500)
PMV BLD AUTO: 10.3 FL (ref 6–12)
RBC # BLD AUTO: 3.06 10*6/MM3 (ref 3.9–5.2)
RH BLD: NEGATIVE
WBC NRBC COR # BLD: 21.91 10*3/MM3 (ref 4.5–10.7)

## 2018-08-09 PROCEDURE — 25010000002 RHO D IMMUNE GLOBULIN 1500 UNIT/2ML SOLUTION PREFILLED SYRINGE: Performed by: OBSTETRICS & GYNECOLOGY

## 2018-08-09 PROCEDURE — 85025 COMPLETE CBC W/AUTO DIFF WBC: CPT | Performed by: OBSTETRICS & GYNECOLOGY

## 2018-08-09 RX ADMIN — DOCUSATE SODIUM 100 MG: 100 CAPSULE, LIQUID FILLED ORAL at 08:16

## 2018-08-09 RX ADMIN — HUMAN RHO(D) IMMUNE GLOBULIN 1500 UNITS: 1500 SOLUTION INTRAMUSCULAR; INTRAVENOUS at 02:21

## 2018-08-09 RX ADMIN — DOCUSATE SODIUM 100 MG: 100 CAPSULE, LIQUID FILLED ORAL at 19:49

## 2018-08-09 RX ADMIN — IBUPROFEN 800 MG: 800 TABLET ORAL at 23:48

## 2018-08-09 RX ADMIN — Medication: at 02:30

## 2018-08-09 RX ADMIN — IBUPROFEN 800 MG: 800 TABLET ORAL at 16:10

## 2018-08-09 RX ADMIN — IBUPROFEN 800 MG: 800 TABLET ORAL at 08:16

## 2018-08-09 NOTE — LACTATION NOTE
P1. Hx of breast cancer and mastectomy on right. Left breast has augmentation under the muscle. Initiated HGP for early establishment of milk supply on left breast. Baby has latched briefly but there was polyhydramnious as well as uterine infusion during labor so she is very spitty.   Has LC number    Lactation Consult Note    Evaluation Completed: 2018 12:45 PM  Patient Name: Laura Quinonez  :  1985  MRN:  [id number]     REFERRAL  INFORMATION:                          Date of Referral: 18   Person Making Referral: nurse  Maternal Reason for Referral: breastfeeding currently, breast surgery/injury history  Infant Reason for Referral: other (see comments) (spitty)    DELIVERY HISTORY:          Skin to skin initiation date/time: 2018  6:16 PM   Skin to skin end date/time:              MATERNAL ASSESSMENT:     Breast Shape: Left:, round (18 1240 : Kadie Longoria RN)  Breast Density: soft (18 1240 : Kadie Longoria, RN)  Areola: surgical scarring (18 1240 : Kadie Longoria, RN)  Nipples: graspable (18 1240 : Kadie Longoria, RN)  Nipple Width: 1.5 cm (18 1240 : Kadie Longoria, RN)             INFANT ASSESSMENT:  Information for the patient's :  Kimberly Quinonez [[id number]]   No past medical history on file.                                                                                                                                MATERNAL INFANT FEEDING:  Maternal Preparation: breast care (18 1240 : Kadie Longoria RN)  Maternal Emotional State: assist needed (18 1240 : Kadie Longoria, RN)                     Milk Ejection Reflex: present (18 1240 : Kadie Longoria, RN)                                           EQUIPMENT TYPE:  Breast Pump Type: double electric, hospital grade (18 1240 : Kadie Longoria, RN)  Breast Pump Flange Type: hard (18 1240 : Kadie Longoria, RN)  Breast  Pump Flange Size: 24 mm (08/09/18 1240 : Kadie Longoria, RN)                        BREAST PUMPING:  Breast Pumping Interventions: early pumping promoted, frequent pumping encouraged (08/09/18 1240 : Kadie Longoria, RN)  Breast Pumping: left breast pumped until soft, single electric breast pump utilized, other (see comments) (mastectomy on right side) (08/09/18 1240 : Kadie Longoria, RN)    LACTATION REFERRALS:  Lactation Referrals: outpatient lactation program (08/09/18 1240 : Kadie Longoria, RN)

## 2018-08-09 NOTE — LACTATION NOTE
Lactation Consult Note  Discussed how to obtain and assisted with deep latch on left breast in cross cradle.  Evaluation Completed: 2018 6:53 PM  Patient Name: Laura Quinonez  :  1985  MRN:  [id number]     REFERRAL  INFORMATION:                          Date of Referral: 18   Person Making Referral: patient  Maternal Reason for Referral: breastfeeding currently, breast surgery/injury history  Infant Reason for Referral: other (see comments) (spitty)    DELIVERY HISTORY:          Skin to skin initiation date/time: 2018  6:16 PM   Skin to skin end date/time:              MATERNAL ASSESSMENT:     Breast Shape: Left:, round (18 : Oksana Arredondo RN)  Breast Density: soft (18 : Oksana Arredondo RN)  Areola: surgical scarring (18 : Oksana Arredondo RN)  Nipples: graspable (18 : Oksana Arredondo RN)                INFANT ASSESSMENT:  Information for the patient's :  Kimberly Quinonez [[id number]]   No past medical history on file.    Feeding Readiness Cues: energy for feeding (18 : Oksana Arredondo RN)   Feeding Method: breastfeeding (18 : Oksana Arredondo RN)   Feeding Tolerance/Success: arousal required, coordinated suck, coordinated swallow (18 : Oksana Arredondo RN)                   Feeding Interventions: latch assistance provided (18 : Oksana Arredondo RN)                       Infant Positioning: cross-cradle (18 : Oksana Arredondo RN)           Effective Latch During Feeding: yes (18 : Oksana Arredondo RN)   Suck/Swallow Coordination: present (18 : Oksana Arredondo RN)   Signs of Milk Transfer: infant jaw motion present, suck/swallow ratio (18 : Oksana Arredondo RN)       Latch: 2-->grasps breast, tongue down, lips flanged, rhythmic sucking (18 : Oksana Arredondo RN)    Audible Swallowin-->a few with stimulation (18 : Oksana Arredondo RN)   Type of Nipple: 2-->everted (after stimulation) (18 : Oksana Arredondo RN)   Comfort (Breast/Nipple): 2-->soft/nontender (18 : Oksana Arredondo RN)   Hold (Positioning): 1-->minimal assist, teach one side, mother does other, staff holds (18 : Oksana Arredondo RN)   Score: 8 (18 : Oksana Arredondo RN)     Infant-Driven Feeding Scales - Readiness: Alert once handled. Some rooting or takes pacifier. Adequate tone. (18 : Oksana Arredondo RN)   Infant-Driven Feeding Scales - Quality: Nipples with a strong coordinated SSB throughout feed. (18 : Oksana Arredondo RN)             MATERNAL INFANT FEEDING:        Infant Positioning: cross-cradle (18 : Oksana Arredondo RN)   Signs of Milk Transfer: infant jaw motion present, suck/swallow ratio (18 : Oksana Arredondo RN)  Pain with Feeding: no (18 : Oksana Arredondo RN)                       Latch Assistance: yes (18 : Oksana Arredondo RN)                               EQUIPMENT TYPE:  Breast Pump Type: double electric, hospital grade (18 : Oksana Arredondo RN)                              BREAST PUMPING:          LACTATION REFERRALS:

## 2018-08-09 NOTE — PROGRESS NOTES
Comfortable with epidural    cvx- 6-7/90/0      toco- iupc placed. Pit turned off due to tachysystole.    fht- reactive, good stephanie. occ late but overall fine.    A/p - active labor.  
[facility]  Vaginal Delivery Progress Note    Patient Name: Laura Quinonez  :  1985  MRN:  [id number]      Subjective   Postpartum Day 1: Vaginal Delivery of a female infant.     The patient feels well without complaints.  Her pain is well controlled.  Reports normal lochia.     The patient plans to breastfeed.    Objective     Vital Signs Range for the last 24 hours  Temperature: Temp:  [97.1 °F (36.2 °C)-101 °F (38.3 °C)] 97.1 °F (36.2 °C)       BP: BP: ()/(45-80) 103/73   Pulse: Heart Rate:  [] 85   Respirations: Resp:  [16-18] 16                       Physical Exam:  General: Awake and alert  Abdomen: Fundus: firm, non tender, 1 below umbilicus  Extremities:  trace edema, NT     Labs:     Lab Results   Component Value Date    WBC 21.91 (H) 2018    HGB 10.0 (L) 2018    HCT 29.1 (L) 2018    MCV 95.1 2018     (L) 2018     Prenatal labs results reviewed:  Yes   Rubella:  immune  Rh Status:    RH type   Date Value Ref Range Status   2018 Negative  Final         Assessment/Plan  : 1. PPD1 S/P  - Doing well, continue usual cares.         Active Problems:    Pregnancy          TRAE Noriega  2018  9:28 AM     Patient seen and examined. Agree with above assessment.   Sarah Beth Arroyo MD  2018  2:45 PM      
35.1

## 2018-08-10 VITALS
HEART RATE: 73 BPM | BODY MASS INDEX: 26.99 KG/M2 | TEMPERATURE: 97.5 F | HEIGHT: 65 IN | DIASTOLIC BLOOD PRESSURE: 73 MMHG | RESPIRATION RATE: 16 BRPM | OXYGEN SATURATION: 98 % | WEIGHT: 162 LBS | SYSTOLIC BLOOD PRESSURE: 112 MMHG

## 2018-08-10 LAB
BASOPHILS # BLD AUTO: 0.03 10*3/MM3 (ref 0–0.2)
BASOPHILS NFR BLD AUTO: 0.2 % (ref 0–1.5)
DEPRECATED RDW RBC AUTO: 46.2 FL (ref 37–54)
EOSINOPHIL # BLD AUTO: 0.14 10*3/MM3 (ref 0–0.7)
EOSINOPHIL NFR BLD AUTO: 0.9 % (ref 0.3–6.2)
ERYTHROCYTE [DISTWIDTH] IN BLOOD BY AUTOMATED COUNT: 13.3 % (ref 11.7–13)
HCT VFR BLD AUTO: 29.6 % (ref 35.6–45.5)
HGB BLD-MCNC: 9.7 G/DL (ref 11.9–15.5)
IMM GRANULOCYTES # BLD: 0.14 10*3/MM3 (ref 0–0.03)
IMM GRANULOCYTES NFR BLD: 0.9 % (ref 0–0.5)
LYMPHOCYTES # BLD AUTO: 1.93 10*3/MM3 (ref 0.9–4.8)
LYMPHOCYTES NFR BLD AUTO: 12.4 % (ref 19.6–45.3)
MCH RBC QN AUTO: 31.8 PG (ref 26.9–32)
MCHC RBC AUTO-ENTMCNC: 32.8 G/DL (ref 32.4–36.3)
MCV RBC AUTO: 97 FL (ref 80.5–98.2)
MONOCYTES # BLD AUTO: 1.24 10*3/MM3 (ref 0.2–1.2)
MONOCYTES NFR BLD AUTO: 8 % (ref 5–12)
NEUTROPHILS # BLD AUTO: 12.06 10*3/MM3 (ref 1.9–8.1)
NEUTROPHILS NFR BLD AUTO: 77.6 % (ref 42.7–76)
PLATELET # BLD AUTO: 135 10*3/MM3 (ref 140–500)
PMV BLD AUTO: 10.4 FL (ref 6–12)
RBC # BLD AUTO: 3.05 10*6/MM3 (ref 3.9–5.2)
WBC NRBC COR # BLD: 15.54 10*3/MM3 (ref 4.5–10.7)

## 2018-08-10 PROCEDURE — 85025 COMPLETE CBC W/AUTO DIFF WBC: CPT | Performed by: PHYSICIAN ASSISTANT

## 2018-08-10 RX ORDER — IBUPROFEN 800 MG/1
800 TABLET ORAL EVERY 8 HOURS PRN
Qty: 30 TABLET | Refills: 0 | Status: SHIPPED | OUTPATIENT
Start: 2018-08-10

## 2018-08-10 RX ADMIN — IBUPROFEN 800 MG: 800 TABLET ORAL at 09:16

## 2018-08-10 NOTE — LACTATION NOTE
Pt to follow up with LC tomorrow for weight check.  Pt to call LC when on the way to hospital.  Wt to be reported to hospitals.  Pt will continue to offer breast and feed back all EBM after pumping.  Pt to start supplements if infant does not latch until pumping volumes increase.

## 2018-08-10 NOTE — LACTATION NOTE
P1. Mom reports baby nursed well yesterday but sleepy during night. Baby is getting assessment now and awake so mom will put her to breast. Mom knows about hand expression and she has been using hgp. She has a personal pump at home. Mom denies questions at this time. Gave \[facility]\"" card if needing f/u  Lactation Consult Note    Evaluation Completed: 8/10/2018 9:36 AM  Patient Name: Laura Quinonez  :  1985  MRN:  [id number]     REFERRAL  INFORMATION:                          Date of Referral: 18   Person Making Referral: patient  Maternal Reason for Referral: breastfeeding currently, breast surgery/injury history  Infant Reason for Referral: other (see comments) (spitty)    DELIVERY HISTORY:          Skin to skin initiation date/time: 2018  6:16 PM   Skin to skin end date/time:              MATERNAL ASSESSMENT:                               INFANT ASSESSMENT:  Information for the patient's :  Kimberly Quinonez [[id number]]   No past medical history on file.                                                                                                                                MATERNAL INFANT FEEDING:                                                                       EQUIPMENT TYPE:                                 BREAST PUMPING:          LACTATION REFERRALS:

## 2018-08-10 NOTE — DISCHARGE SUMMARY
Date of Discharge:  8/10/2018    Discharge Diagnosis: vaginal delivery    Presenting Problem/History of Present Illness  Pregnancy [Z34.90]       Hospital Course  Patient is a 33 y.o. female presented with labor. Had uncomplicated vaginal delivery. Postpartum course unremarkable.      Procedures Performed         Consults:   Consults     Date and Time Order Name Status Description    2018 1926 Inpatient consult to Anesthesiology            Condition on Discharge:   Subjective   Postpartum Day 2 Vaginal Delivery.    The patient feels well without complaints.    Vital Signs  Temp:  [97.4 °F (36.3 °C)-98 °F (36.7 °C)] 97.5 °F (36.4 °C)  Heart Rate:  [] 73  Resp:  [16] 16  BP: (112-117)/(70-73) 112/73    Physical Exam:   General: Awake and alert   Abdomen: Fundus: firm, non tender    Extremities:  Calves NT bilaterally    Assessment/Plan     PPD2  S/P  -   Stable for discharge. Instructions reviewed      Discharge Disposition  Home or Self Care    Discharge Medications     Discharge Medications      New Medications      Instructions Start Date   ibuprofen 800 MG tablet  Commonly known as:  ADVIL,MOTRIN   800 mg, Oral, Every 8 Hours PRN         Continue These Medications      Instructions Start Date   CALTRATE 600+D 600-800 MG-UNIT tablet  Generic drug:  calcium carb-cholecalciferol   1 tablet, Oral      DHA Omega 3 100 MG capsule   2 tablet/day, Oral      prenatal (CLASSIC) vitamin 28-0.8 MG tablet tablet   1 tablet, Oral, Daily         Stop These Medications    aspirin 81 MG EC tablet     Folic Acid 5 MG capsule              The patient has been prescribed a controlled substance.  She has been counseled on the risks associated with using the medication.   The addictive potential of this medication and alternatives were discussed carefully with this patient and she demonstrated understanding.  A TONY report has been obtained and reviewed.       Activity at Discharge:     Follow-up Appointments  No  future appointments.      Test Results Pending at Discharge       Sadaf Mccabe MD  08/10/18  9:41 AM

## 2021-04-16 ENCOUNTER — BULK ORDERING (OUTPATIENT)
Dept: CASE MANAGEMENT | Facility: OTHER | Age: 36
End: 2021-04-16

## 2021-04-16 DIAGNOSIS — Z23 IMMUNIZATION DUE: ICD-10-CM

## 2023-05-01 ENCOUNTER — TRANSCRIBE ORDERS (OUTPATIENT)
Dept: ULTRASOUND IMAGING | Facility: HOSPITAL | Age: 38
End: 2023-05-01
Payer: COMMERCIAL

## 2023-05-01 DIAGNOSIS — O09.812 SUPRVSN OF PREG RSLT FROM ASSIST REPRODCTV TECH, SECOND TRI: Primary | ICD-10-CM

## 2023-08-07 ENCOUNTER — TRANSCRIBE ORDERS (OUTPATIENT)
Dept: ULTRASOUND IMAGING | Facility: HOSPITAL | Age: 38
End: 2023-08-07
Payer: COMMERCIAL

## 2023-08-07 DIAGNOSIS — O09.529 ANTEPARTUM MULTIGRAVIDA OF ADVANCED MATERNAL AGE: Primary | ICD-10-CM

## 2023-08-08 NOTE — PROGRESS NOTES
MATERNAL FETAL MEDICINE Consult Note    Dear Dr Angy Gilbert MD:    Thank you for your kind referral of Laura Quinonez.  As you know, she is a 38 y.o.   at  26 5/7 weeks gestation (Estimated Date of Delivery: None noted.). This is a consult.      Her antepartum course is complicated by:  Complete previa, possible placenta accreta    Aneuploidy Screening: low risk cell free DNA    HPI: Today, she denies headache, blurry vision, RUQ pain. No vaginal bleeding, no contractions.     Review of History:  Past Medical History:   Diagnosis Date    Abnormal Pap smear of cervix     ascus    Breast carcinoma     age 21    Hemochromatosis     MTHFR mutation     Scoliosis, adolescent acquired     no treatment     Past Surgical History:   Procedure Laterality Date    BREAST AUGMENTATION      age 21    BREAST LUMPECTOMY      BREAST RECONSTRUCTION      age 21    DILATATION AND CURETTAGE      MASTECTOMY      WISDOM TOOTH EXTRACTION         Social History     Socioeconomic History    Marital status:    Tobacco Use    Smoking status: Never     Passive exposure: Never    Smokeless tobacco: Never   Vaping Use    Vaping Use: Never used   Substance and Sexual Activity    Alcohol use: No    Drug use: No    Sexual activity: Yes     Partners: Male     Family History   Problem Relation Age of Onset    Diabetes Father     Osteoporosis Mother       No Known Allergies   Current Outpatient Medications on File Prior to Visit   Medication Sig Dispense Refill    aspirin 81 MG EC tablet Take 1 tablet by mouth Daily.      Docosahexaenoic Acid (DHA OMEGA 3) 100 MG capsule Take 2 tablet/day by mouth.      folic acid (FOLVITE) 400 MCG tablet Take 1 tablet by mouth Daily.      Prenatal Vit-Fe Fumarate-FA (PRENATAL, CLASSIC, VITAMIN) 28-0.8 MG tablet tablet Take 1 tablet by mouth Daily.       No current facility-administered medications on file prior to visit.        Past obstetric, gynecological, medical, surgical, family and social  "history reviewed.  Relevant lab work and imaging reviewed.    Review of systems  Constitutional:  denies fever, chills, malaise.   ENT/Mouth:  denies sore throat, tinnitis  Eyes: denies vision changes/pain  CV:  denies chest pain  Respiratory:  denies cough/SOB  GI:  denies N/V, diarrhea, abdominal pain.    :   denies dysuria  Skin:  denies lesions or pruritis   Neuro:  denies weakness, focal neurologic symptoms    Vitals:    23 1042   BP: 112/68   BP Location: Right arm   Patient Position: Sitting   Cuff Size: Adult   Pulse: 95   Temp: 98 øF (36.7 øC)   Weight: 74.2 kg (163 lb 8 oz)   Height: 162.6 cm (64\")         PHYSICAL EXAM   GENERAL: Not in acute distress, AAOx3, pleasant  CARDIO: regular rate and rhythm  PULM: symmetric chest rise, speaking in complete sentences without difficulty  NEURO: awake, alert and oriented to person, place, and time  ABDOMINAL: No fundal tenderness, no rebound or guarding, gravid  EXTREMITIES: no bilateral lower extremity edema/tenderness  SKIN: Warm, well-perfused      ULTRASOUND   Please view full ultrasound note on Imaging tab in ViewPoint.  Breech presentation.  Complete previa with prominent appearing maternal vasculature concerning for possible accreta.  JENNI 23 cm, which is normal.  EFW 1159 g (71%, AC 90%)  Cervical length 3.2 cm, which is normal.     ASSESSMENT/COUNSELIN y.o.   at  26 5/7 weeks gestation (Estimated Date of Delivery: None noted.).     -Pregnancy  [ X ] stable  [   ] improving [  ] worsening    Diagnoses and all orders for this visit:    1. Antepartum multigravida of advanced maternal age (Primary)    2. [place] product of IVF pregnancy    3. Placenta previa antepartum           IVF Pregnancy:  Previously counseled: This patient's pregnancy was conceived via IVF.  We discussed that the risk of birth defects in pregnancies conceived through IVF or ICSI is slightly higher than that for naturally conceived pregnancies. The risk for birth " defects for IVF and ICSI pregnancies has ranged from 5-8%.   The most common birth defects in pregnanices conceived with IVF-ICSI are cardiac malformations and genitourinary malformations.  The risk of birth defects from autologous and donor oocytes does not differ.    There appears to be a small increased risk of uteroplacental insufficiency (FGR,  Pre-eclampsia) and abnormal placentation (Accreta spectrum) in IVF pregnancies.  The risk of gestational diabetes is also increased slightly.     Thus, a fetal ECHO is indicated to further evaluate for cardiac malformation.  Serial growth exams are indicated.      Advanced Maternal Age  Previously counseled: Noninvasive prenatal screening with cell-free fetal DNA (NIPT) results reviewed, which were normal.    Advanced maternal age has been associated with placental insufficiency with increased risk of fetal growth disorder and hypertensive disorders. Recommend fetal growth surveillance. Start  fetal surveillance with weekly BPP at 32 weeks.     Recommend baby ASA, which she is taking.      Placenta previa/possible accreta:     Previous counseling: A morbidly adherent placenta is a general term that includes placenta accreta, increta, and percreta.  The most important risk factor for placenta accreta is placenta previa after a prior  delivery--she had a previous vaginal delivery but has had 2 D&C's and an IVF pregnancy. The abnormally implanted placenta is often diagnosed on prenatal sonographic evaluation of the placenta in a woman with risk factors for accreta (previa, previous  delivery), but may be an incidental finding.      Signs of placenta accreta include: uterine bulge, bladder wall interruption, focal exophytic mass, asymmetric thickening of the placenta, loss of hypotintense interface, myometrial thinning, abnormal vascularization, placental lakes, etc.  She has many of these on ultrasound today with a complete previa and  hypervaculature.  I suspect an accreta, but it is always difficult to tell for sure invasion on US.        I strongly suspect an placenta accreta spectrum disorder today.  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  She understands that this diagnosis necessitates hysterectomy and she is at risk for hemorrhage, needing blood transfusions, ICU stay, etc.  She understands all of this very well and understands that when it is found and managed outcomes are great.      She has had a consult at [facility] where they are much less concerned about a placenta accreta.  I see a lot of prominent maternal vasculature that does not necessarily appear normal.  I think the next best step is an MRI, which she has scheduled.  I discussed this with her--the dilemma of needing to make sure we keep her safe but also make sure we are not overcalling it/destining her to a hysterectomy unnecessarily.  I think we should get the MRI then sit down and discuss next steps.  If MRI is not concerning for accreta, I think we should engage gyn onc here and plan for a 35-36 week delivery for placenta previa possible accreta in the main OR. We discussed sometimes when it is unclear/there are differing opinions, the best option is to see what the placenta does after delivery.  If we do this, I would recommend a course of steroids leading up if the pt does not have gestational diabetes dx at that time.  She is amenable to this plan.  We will bring her back in the 30th week for care planning and discussion in coordination with Dr. Gilbert, as well.      She asked about being off work.  Discussed pelvic rest, lifting precautions, etc.  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  Discussed up to her and her stress level at work--she could even consider a hybrid situation and do a couple days a week at work but work at home some of the time--she will  think about this.      She had a normal fetal ECHo at [facility] it sounds like.       Summary of Plan  -Pt going to [facility] for MRI placenta  -Scheduled follow up growth to discuss delivery planning after MRI--likely will engage gyn onc and recommend a planned delivery at 35-36 weeks for previa possible accreta and see what the placenta does after delivery.    -Starting at 32 weeks: Weekly fetal  surveillance until delivery   -If accreta confirmed, will discuss delivery location.      Follow-up: 3-4 weeks care planning, look again at placenta    Thank you for the consult and opportunity to care for this patient.  Please feel free to reach out with any questions or concerns.      I spent 20 minutes caring for this patient on this date of service. This time includes time spent by me in the following activities: preparing for the visit, reviewing tests, obtaining and/or reviewing a separately obtained history, performing a medically appropriate examination and/or evaluation, counseling and educating the patient/family/caregiver and independently interpreting results and communicating that information with the patient/family/caregiver with greater than 50% spent in counseling and coordination of care.       I spent 5 minutes on the separately reported service of US imaging not included in the time used to support the E/M service also reported today.      Cathy Watson MD FACOG  Maternal Fetal Medicine-[facility]  Office: [phone]  [email or web address]

## 2023-08-09 ENCOUNTER — OFFICE VISIT (OUTPATIENT)
Dept: OBSTETRICS AND GYNECOLOGY | Facility: CLINIC | Age: 38
End: 2023-08-09
Payer: COMMERCIAL

## 2023-08-09 ENCOUNTER — HOSPITAL ENCOUNTER (OUTPATIENT)
Dept: ULTRASOUND IMAGING | Facility: HOSPITAL | Age: 38
Discharge: HOME | End: 2023-08-09
Admitting: OBSTETRICS & GYNECOLOGY
Payer: COMMERCIAL

## 2023-08-09 VITALS
WEIGHT: 163.5 LBS | SYSTOLIC BLOOD PRESSURE: 112 MMHG | BODY MASS INDEX: 27.91 KG/M2 | DIASTOLIC BLOOD PRESSURE: 68 MMHG | HEART RATE: 95 BPM | TEMPERATURE: 98 F | HEIGHT: 64 IN

## 2023-08-09 DIAGNOSIS — O44.00 PLACENTA PREVIA ANTEPARTUM: ICD-10-CM

## 2023-08-09 DIAGNOSIS — O09.529 ANTEPARTUM MULTIGRAVIDA OF ADVANCED MATERNAL AGE: Primary | ICD-10-CM

## 2023-08-09 DIAGNOSIS — O09.529 ANTEPARTUM MULTIGRAVIDA OF ADVANCED MATERNAL AGE: ICD-10-CM

## 2023-08-09 PROCEDURE — 76817 TRANSVAGINAL US OBSTETRIC: CPT

## 2023-08-09 PROCEDURE — 76816 OB US FOLLOW-UP PER FETUS: CPT

## 2023-08-09 NOTE — PROGRESS NOTES
No lof, no ctx's. Noticing some edema towards eveining. No blurred vision or H/A . Otherwise no complaints.

## 2023-08-09 NOTE — LETTER
2023     Angy Gilbert MD  [address]  [address]  [place] [address]    Patient: Laura Quinonez   YOB: 1985   Date of Visit: 2023       Dear Angy Gilbert MD,    Thank you for referring Laura Quinonez to me for evaluation. Below is a copy of my consult note.    If you have questions, please do not hesitate to call me. I look forward to following Laura along with you.         Sincerely,        Cathy Watson MD      MATERNAL FETAL MEDICINE Consult Note    Dear Dr Angy Gilbert MD:    Thank you for your kind referral of Laura Quinonez.  As you know, she is a 38 y.o.   at  26 5/7 weeks gestation (Estimated Date of Delivery: None noted.). This is a consult.      Her antepartum course is complicated by:  Complete previa, possible placenta accreta    Aneuploidy Screening: low risk cell free DNA    HPI: Today, she denies headache, blurry vision, RUQ pain. No vaginal bleeding, no contractions.     Review of History:  Past Medical History:   Diagnosis Date    Abnormal Pap smear of cervix     ascus    Breast carcinoma     age 21    Hemochromatosis     MTHFR mutation     Scoliosis, adolescent acquired     no treatment     Past Surgical History:   Procedure Laterality Date    BREAST AUGMENTATION      age 21    BREAST LUMPECTOMY      BREAST RECONSTRUCTION      age 21    DILATATION AND CURETTAGE      MASTECTOMY      WISDOM TOOTH EXTRACTION         Social History     Socioeconomic History    Marital status:    Tobacco Use    Smoking status: Never     Passive exposure: Never    Smokeless tobacco: Never   Vaping Use    Vaping Use: Never used   Substance and Sexual Activity    Alcohol use: No    Drug use: No    Sexual activity: Yes     Partners: Male     Family History   Problem Relation Age of Onset    Diabetes Father     Osteoporosis Mother       No Known Allergies   Current Outpatient Medications on File Prior to Visit   Medication Sig Dispense Refill     "aspirin 81 MG EC tablet Take 1 tablet by mouth Daily.      Docosahexaenoic Acid (DHA OMEGA 3) 100 MG capsule Take 2 tablet/day by mouth.      folic acid (FOLVITE) 400 MCG tablet Take 1 tablet by mouth Daily.      Prenatal Vit-Fe Fumarate-FA (PRENATAL, CLASSIC, VITAMIN) 28-0.8 MG tablet tablet Take 1 tablet by mouth Daily.       No current facility-administered medications on file prior to visit.        Past obstetric, gynecological, medical, surgical, family and social history reviewed.  Relevant lab work and imaging reviewed.    Review of systems  Constitutional:  denies fever, chills, malaise.   ENT/Mouth:  denies sore throat, tinnitis  Eyes: denies vision changes/pain  CV:  denies chest pain  Respiratory:  denies cough/SOB  GI:  denies N/V, diarrhea, abdominal pain.    :   denies dysuria  Skin:  denies lesions or pruritis   Neuro:  denies weakness, focal neurologic symptoms    Vitals:    23 1042   BP: 112/68   BP Location: Right arm   Patient Position: Sitting   Cuff Size: Adult   Pulse: 95   Temp: 98 øF (36.7 øC)   Weight: 74.2 kg (163 lb 8 oz)   Height: 162.6 cm (64\")         PHYSICAL EXAM   GENERAL: Not in acute distress, AAOx3, pleasant  CARDIO: regular rate and rhythm  PULM: symmetric chest rise, speaking in complete sentences without difficulty  NEURO: awake, alert and oriented to person, place, and time  ABDOMINAL: No fundal tenderness, no rebound or guarding, gravid  EXTREMITIES: no bilateral lower extremity edema/tenderness  SKIN: Warm, well-perfused      ULTRASOUND   Please view full ultrasound note on Imaging tab in ViewPoint.  Breech presentation.  Complete previa with prominent appearing maternal vasculature concerning for possible accreta.  JENNI 23 cm, which is normal.  EFW 1159 g (71%, AC 90%)  Cervical length 3.2 cm, which is normal.     ASSESSMENT/COUNSELIN y.o.   at  26 5/7 weeks gestation (Estimated Date of Delivery: None noted.).     -Pregnancy  [ X ] stable  [   ] " improving [  ] worsening    Diagnoses and all orders for this visit:    1. Antepartum multigravida of advanced maternal age (Primary)    2. [place] product of IVF pregnancy    3. Placenta previa antepartum           IVF Pregnancy:  Previously counseled: This patient's pregnancy was conceived via IVF.  We discussed that the risk of birth defects in pregnancies conceived through IVF or ICSI is slightly higher than that for naturally conceived pregnancies. The risk for birth defects for IVF and ICSI pregnancies has ranged from 5-8%.   The most common birth defects in pregnanices conceived with IVF-ICSI are cardiac malformations and genitourinary malformations.  The risk of birth defects from autologous and donor oocytes does not differ.    There appears to be a small increased risk of uteroplacental insufficiency (FGR,  Pre-eclampsia) and abnormal placentation (Accreta spectrum) in IVF pregnancies.  The risk of gestational diabetes is also increased slightly.     Thus, a fetal ECHO is indicated to further evaluate for cardiac malformation.  Serial growth exams are indicated.      Advanced Maternal Age  Previously counseled: Noninvasive prenatal screening with cell-free fetal DNA (NIPT) results reviewed, which were normal.    Advanced maternal age has been associated with placental insufficiency with increased risk of fetal growth disorder and hypertensive disorders. Recommend fetal growth surveillance. Start  fetal surveillance with weekly BPP at 32 weeks.     Recommend baby ASA, which she is taking.      Placenta previa/possible accreta:     Previous counseling: A morbidly adherent placenta is a general term that includes placenta accreta, increta, and percreta.  The most important risk factor for placenta accreta is placenta previa after a prior  delivery--she had a previous vaginal delivery but has had 2 D&C's and an IVF pregnancy. The abnormally implanted placenta is often diagnosed on prenatal  sonographic evaluation of the placenta in a woman with risk factors for accreta (previa, previous  delivery), but may be an incidental finding.      Signs of placenta accreta include: uterine bulge, bladder wall interruption, focal exophytic mass, asymmetric thickening of the placenta, loss of hypotintense interface, myometrial thinning, abnormal vascularization, placental lakes, etc.  She has many of these on ultrasound today with a complete previa and hypervaculature.  I suspect an accreta, but it is always difficult to tell for sure invasion on US.        I strongly suspect an placenta accreta spectrum disorder today.  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  She understands that this diagnosis necessitates hysterectomy and she is at risk for hemorrhage, needing blood transfusions, ICU stay, etc.  She understands all of this very well and understands that when it is found and managed outcomes are great.      She has had a consult at [facility] where they are much less concerned about a placenta accreta.  I see a lot of prominent maternal vasculature that does not necessarily appear normal.  I think the next best step is an MRI, which she has scheduled.  I discussed this with her--the dilemma of needing to make sure we keep her safe but also make sure we are not overcalling it/destining her to a hysterectomy unnecessarily.  I think we should get the MRI then sit down and discuss next steps.  If MRI is not concerning for accreta, I think we should engage gyn onc here and plan for a 35-36 week delivery for placenta previa possible accreta in the main OR. We discussed sometimes when it is unclear/there are differing opinions, the best option is to see what the placenta does after delivery.  If we do this, I would recommend a course of steroids leading up if the pt does not have gestational diabetes dx at that time.  She is amenable to  this plan.  We will bring her back in the 30th week for care planning and discussion in coordination with Dr. Gilbert, as well.      She asked about being off work.  Discussed pelvic rest, lifting precautions, etc.  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  Discussed up to her and her stress level at work--she could even consider a hybrid situation and do a couple days a week at work but work at home some of the time--she will think about this.      She had a normal fetal ECHo at [facility] it sounds like.       Summary of Plan  -Pt going to [facility] for MRI placenta  -Scheduled follow up growth to discuss delivery planning after MRI--likely will engage gyn onc and recommend a planned delivery at 35-36 weeks for previa possible accreta and see what the placenta does after delivery.    -Starting at 32 weeks: Weekly fetal  surveillance until delivery   -If accreta confirmed, will discuss delivery location.      Follow-up: 3-4 weeks care planning, look again at placenta    Thank you for the consult and opportunity to care for this patient.  Please feel free to reach out with any questions or concerns.      I spent 20 minutes caring for this patient on this date of service. This time includes time spent by me in the following activities: preparing for the visit, reviewing tests, obtaining and/or reviewing a separately obtained history, performing a medically appropriate examination and/or evaluation, counseling and educating the patient/family/caregiver and independently interpreting results and communicating that information with the patient/family/caregiver with greater than 50% spent in counseling and coordination of care.       I spent 5 minutes on the separately reported service of US imaging not included in the time used to support the E/M service also reported today.      Cathy Watson MD FACOG  Maternal Fetal Medicine-[facility]  Office:  [phone]  [email or web address]

## 2023-08-30 ENCOUNTER — TRANSCRIBE ORDERS (OUTPATIENT)
Dept: ULTRASOUND IMAGING | Facility: HOSPITAL | Age: 38
End: 2023-08-30
Payer: COMMERCIAL

## 2023-08-30 DIAGNOSIS — O09.523 MULTIGRAVIDA OF ADVANCED MATERNAL AGE IN THIRD TRIMESTER: ICD-10-CM

## 2023-09-05 NOTE — PROGRESS NOTES
MATERNAL FETAL MEDICINE Consult Note    Dear Dr Lira ref. provider found:    Thank you for your kind referral of Laura Quinonez.  As you know, she is a 38 y.o.   at  30 5/7 weeks gestation (Estimated Date of Delivery: None noted.). This is a consult.      Her antepartum course is complicated by:  Complete previa, no MRI evidence of accreta    Aneuploidy Screening: low risk cell free DNA    HPI: Today, she denies headache, blurry vision, RUQ pain. No vaginal bleeding, no contractions.     Review of History:  Past Medical History:   Diagnosis Date    Abnormal Pap smear of cervix     ascus    Breast carcinoma     age 21    Hemochromatosis     MTHFR mutation     Scoliosis, adolescent acquired     no treatment     Past Surgical History:   Procedure Laterality Date    BREAST AUGMENTATION      age 21    BREAST LUMPECTOMY      BREAST RECONSTRUCTION      age 21    DILATATION AND CURETTAGE      MASTECTOMY      WISDOM TOOTH EXTRACTION         Social History     Socioeconomic History    Marital status:    Tobacco Use    Smoking status: Never     Passive exposure: Never    Smokeless tobacco: Never   Vaping Use    Vaping Use: Never used   Substance and Sexual Activity    Alcohol use: No    Drug use: No    Sexual activity: Yes     Partners: Male     Family History   Problem Relation Age of Onset    Diabetes Father     Osteoporosis Mother       No Known Allergies   Current Outpatient Medications on File Prior to Visit   Medication Sig Dispense Refill    aspirin 81 MG EC tablet Take 1 tablet by mouth Daily.      Docosahexaenoic Acid (DHA OMEGA 3) 100 MG capsule Take 2 tablet/day by mouth.      folic acid (FOLVITE) 400 MCG tablet Take 1 tablet by mouth Daily.      Prenatal Vit-Fe Fumarate-FA (PRENATAL, CLASSIC, VITAMIN) 28-0.8 MG tablet tablet Take 1 tablet by mouth Daily.       No current facility-administered medications on file prior to visit.        Past obstetric, gynecological, medical, surgical, family and  social history reviewed.  Relevant lab work and imaging reviewed.    Review of systems  Constitutional:  denies fever, chills, malaise.   ENT/Mouth:  denies sore throat, tinnitis  Eyes: denies vision changes/pain  CV:  denies chest pain  Respiratory:  denies cough/SOB  GI:  denies N/V, diarrhea, abdominal pain.    :   denies dysuria  Skin:  denies lesions or pruritis   Neuro:  denies weakness, focal neurologic symptoms    Vitals:    23 0846   BP: 119/71   BP Location: Right arm   Patient Position: Sitting   Pulse: 94   Temp: 98.4 °F (36.9 °C)   TempSrc: Temporal   Weight: 75.8 kg (167 lb)           PHYSICAL EXAM   GENERAL: Not in acute distress, AAOx3, pleasant  CARDIO: regular rate and rhythm  PULM: symmetric chest rise, speaking in complete sentences without difficulty  NEURO: awake, alert and oriented to person, place, and time  ABDOMINAL: No fundal tenderness, no rebound or guarding, gravid  EXTREMITIES: no bilateral lower extremity edema/tenderness  SKIN: Warm, well-perfused      ULTRASOUND   Please view full ultrasound note on Imaging tab in ViewPoint.  Breech presentation.  Complete placenta previa.   JENNI 16 cm, which is normal.    EFW 2053 g (78% AC 99%)  BPP 8    ASSESSMENT/COUNSELIN y.o.   at  30 5/7 weeks gestation (Estimated Date of Delivery: None noted.).     -Pregnancy  [ X ] stable  [   ] improving [  ] worsening    Diagnoses and all orders for this visit:    1. Antepartum multigravida of advanced maternal age (Primary)    2.  product of IVF pregnancy    3. Placenta previa antepartum    4. Placenta accreta in second trimester             IVF Pregnancy:  Previously counseled: This patient's pregnancy was conceived via IVF.  We discussed that the risk of birth defects in pregnancies conceived through IVF or ICSI is slightly higher than that for naturally conceived pregnancies. The risk for birth defects for IVF and ICSI pregnancies has ranged from 5-8%.   The most common  birth defects in pregnanices conceived with IVF-ICSI are cardiac malformations and genitourinary malformations.  The risk of birth defects from autologous and donor oocytes does not differ.    There appears to be a small increased risk of uteroplacental insufficiency (FGR,  Pre-eclampsia) and abnormal placentation (Accreta spectrum) in IVF pregnancies.  The risk of gestational diabetes is also increased slightly.     Thus, a fetal ECHO is indicated to further evaluate for cardiac malformation.  Serial growth exams are indicated.      Advanced Maternal Age  Previously counseled: Noninvasive prenatal screening with cell-free fetal DNA (NIPT) results reviewed, which were normal.    Advanced maternal age has been associated with placental insufficiency with increased risk of fetal growth disorder and hypertensive disorders. Recommend fetal growth surveillance. Start  fetal surveillance with weekly BPP at 32 weeks.     Recommend baby ASA, which she is taking.      Placenta previa  Per [facility] no accreta on MRI--records viewed.    Previous counseling: A morbidly adherent placenta is a general term that includes placenta accreta, increta, and percreta.  The most important risk factor for placenta accreta is placenta previa after a prior  delivery--she had a previous vaginal delivery but has had 2 D&C's and an IVF pregnancy. The abnormally implanted placenta is often diagnosed on prenatal sonographic evaluation of the placenta in a woman with risk factors for accreta (previa, previous  delivery), but may be an incidental finding.      Signs of placenta accreta include: uterine bulge, bladder wall interruption, focal exophytic mass, asymmetric thickening of the placenta, loss of hypotintense interface, myometrial thinning, abnormal vascularization, placental lakes, etc.  She has many of these on ultrasound today with a complete previa and hypervaculature.  I suspect an accreta, but it is always  difficult to tell for sure invasion on US.        Although we were initially concerned about placenta accreta spectrum, her follow up imaging including MRI has been more reassuring that this is not the case.  Certainly, we cannot rule out a small focal accreta on any amount of imaging.  I discussed this with her--the dilemma of needing to make sure we keep her safe but also make sure we are not overcalling it/destining her to a hysterectomy unnecessarily.  I would recommend delivery around 36 weeks for placenta previa.  Extra precautions should be considered includin large bore IV's, anesthesia consult/extra anesthesia support, main OR delivery,  PRBCs/FFP/Platlets immediately available in OR, cell saver.  I would recommend steroids in the week leading up to delivery at primary OB office.      I will leave to Dr. Gilbert whether she wants to get gyn oncology involved/aware given the lack of concern on MRI for placenta accreta.      We discussed being off work.   Discussed pelvic rest, lifting precautions, etc.  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  Discussed up to her and her stress level at work--she could even consider a hybrid situation and do a couple days a week at work but work at home some of the time--she will think about this and may transition fully to home in the future.      She asked about a vasa previa--we went over her images. There is certainly placenta between the cord insertion and cervix and there is no point where free fetal vessels traverse the cervix, so I have a low suspicion for this at this point.  The placenta does thin out near the cervix and appers to be bilobed, but there are no vessels in this membrane--the cord insertion is in the placenta proper more distally.      She had a normal fetal ECHo at [facility] it sounds like.       Fetal arrhythmia:  Not seen today    Summary of Plan  -S/p MRI: see delivery planning above--will leave to Dr. Gilbert  if she wants to engage gyn oncology given lack of concern for accreta on MRI at [facility].    -Starting at 32 weeks: Weekly fetal  surveillance until delivery at primary OB  -Delivery 36th week for placenta previa with steroids preceding    Follow-up: 3-4 weeks care planning, look again at placenta    Thank you for the consult and opportunity to care for this patient.  Please feel free to reach out with any questions or concerns.      I spent 20 minutes caring for this patient on this date of service. This time includes time spent by me in the following activities: preparing for the visit, reviewing tests, obtaining and/or reviewing a separately obtained history, performing a medically appropriate examination and/or evaluation, counseling and educating the patient/family/caregiver and independently interpreting results and communicating that information with the patient/family/caregiver with greater than 50% spent in counseling and coordination of care.       I spent 5 minutes on the separately reported service of US imaging not included in the time used to support the E/M service also reported today.      Cathy Watson MD FACOG  Maternal Fetal Medicine-[facility]  Office: [phone]  [email or web address]

## 2023-09-06 ENCOUNTER — HOSPITAL ENCOUNTER (OUTPATIENT)
Dept: ULTRASOUND IMAGING | Facility: HOSPITAL | Age: 38
Discharge: HOME | End: 2023-09-06
Payer: COMMERCIAL

## 2023-09-06 ENCOUNTER — APPOINTMENT (OUTPATIENT)
Dept: OTHER | Facility: HOSPITAL | Age: 38
End: 2023-09-06
Payer: COMMERCIAL

## 2023-09-06 ENCOUNTER — OFFICE VISIT (OUTPATIENT)
Dept: OBSTETRICS AND GYNECOLOGY | Facility: CLINIC | Age: 38
End: 2023-09-06
Payer: COMMERCIAL

## 2023-09-06 VITALS
DIASTOLIC BLOOD PRESSURE: 71 MMHG | HEART RATE: 94 BPM | SYSTOLIC BLOOD PRESSURE: 119 MMHG | WEIGHT: 167 LBS | TEMPERATURE: 98.4 F | BODY MASS INDEX: 28.67 KG/M2

## 2023-09-06 DIAGNOSIS — O43.212 PLACENTA ACCRETA IN SECOND TRIMESTER: ICD-10-CM

## 2023-09-06 DIAGNOSIS — O09.529 ANTEPARTUM MULTIGRAVIDA OF ADVANCED MATERNAL AGE: Primary | ICD-10-CM

## 2023-09-06 DIAGNOSIS — O44.00 PLACENTA PREVIA ANTEPARTUM: ICD-10-CM

## 2023-09-06 DIAGNOSIS — O09.523 MULTIGRAVIDA OF ADVANCED MATERNAL AGE IN THIRD TRIMESTER: ICD-10-CM

## 2023-09-06 PROCEDURE — 76817 TRANSVAGINAL US OBSTETRIC: CPT

## 2023-09-06 PROCEDURE — 76816 OB US FOLLOW-UP PER FETUS: CPT

## 2023-09-06 NOTE — PROGRESS NOTES
Pt reports that she is doing well and denies vaginal bleeding or LOF at this time. Notes occasional abdominal tightness, denies consistency or pain. Reports active fetal movement. Reviewed when to call OB office or present to L&D for evaluation with symptoms such as decreased fetal movement, vaginal bleeding, LOF or ctxs. Pt verbalized understanding. Denies HA, visual changes or epigastric pain. Denies any additional complaints at time of appointment. Next OB appointment scheduled for 9/13.  Reports from [place] (MRI, MFM report, Fetal ECHO) on chart for Dr. Watson to review.     Vitals:    09/06/23 0846   BP: 119/71   Pulse: 94   Temp: 98.4 °F (36.9 °C)

## 2023-09-06 NOTE — LETTER
2023     Angy Gilbert MD  [address]  [facility] [address]  [facility] [address]    Patient: Laura Quinonez   YOB: 1985   Date of Visit: 2023       Dear Angy Gilbert MD,    Thank you for referring Laura Quinonez to me for evaluation. Below is a copy of my consult note.    If you have questions, please do not hesitate to call me. I look forward to following Laura along with you.         Sincerely,        Cathy Watson MD        MATERNAL FETAL MEDICINE Consult Note    Dear  No ref. provider found:    Thank you for your kind referral of Laura Quinonez.  As you know, she is a 38 y.o.   at  30 5/7 weeks gestation (Estimated Date of Delivery: None noted.). This is a consult.      Her antepartum course is complicated by:  Complete previa, no MRI evidence of accreta    Aneuploidy Screening: low risk cell free DNA    HPI: Today, she denies headache, blurry vision, RUQ pain. No vaginal bleeding, no contractions.     Review of History:  Past Medical History:   Diagnosis Date   • Abnormal Pap smear of cervix     ascus   • Breast carcinoma     age 21   • Hemochromatosis    • MTHFR mutation    • Scoliosis, adolescent acquired     no treatment     Past Surgical History:   Procedure Laterality Date   • BREAST AUGMENTATION      age 21   • BREAST LUMPECTOMY     • BREAST RECONSTRUCTION      age 21   • DILATATION AND CURETTAGE     • MASTECTOMY     • WISDOM TOOTH EXTRACTION         Social History     Socioeconomic History   • Marital status:    Tobacco Use   • Smoking status: Never     Passive exposure: Never   • Smokeless tobacco: Never   Vaping Use   • Vaping Use: Never used   Substance and Sexual Activity   • Alcohol use: No   • Drug use: No   • Sexual activity: Yes     Partners: Male     Family History   Problem Relation Age of Onset   • Diabetes Father    • Osteoporosis Mother       No Known Allergies   Current Outpatient Medications on File Prior to Visit   Medication Sig Dispense  Refill   • aspirin 81 MG EC tablet Take 1 tablet by mouth Daily.     • Docosahexaenoic Acid (DHA OMEGA 3) 100 MG capsule Take 2 tablet/day by mouth.     • folic acid (FOLVITE) 400 MCG tablet Take 1 tablet by mouth Daily.     • Prenatal Vit-Fe Fumarate-FA (PRENATAL, CLASSIC, VITAMIN) 28-0.8 MG tablet tablet Take 1 tablet by mouth Daily.       No current facility-administered medications on file prior to visit.        Past obstetric, gynecological, medical, surgical, family and social history reviewed.  Relevant lab work and imaging reviewed.    Review of systems  Constitutional:  denies fever, chills, malaise.   ENT/Mouth:  denies sore throat, tinnitis  Eyes: denies vision changes/pain  CV:  denies chest pain  Respiratory:  denies cough/SOB  GI:  denies N/V, diarrhea, abdominal pain.    :   denies dysuria  Skin:  denies lesions or pruritis   Neuro:  denies weakness, focal neurologic symptoms    Vitals:    23 0846   BP: 119/71   BP Location: Right arm   Patient Position: Sitting   Pulse: 94   Temp: 98.4 °F (36.9 °C)   TempSrc: Temporal   Weight: 75.8 kg (167 lb)           PHYSICAL EXAM   GENERAL: Not in acute distress, AAOx3, pleasant  CARDIO: regular rate and rhythm  PULM: symmetric chest rise, speaking in complete sentences without difficulty  NEURO: awake, alert and oriented to person, place, and time  ABDOMINAL: No fundal tenderness, no rebound or guarding, gravid  EXTREMITIES: no bilateral lower extremity edema/tenderness  SKIN: Warm, well-perfused      ULTRASOUND   Please view full ultrasound note on Imaging tab in ViewPoint.  Breech presentation.  Complete placenta previa.   JENNI 16 cm, which is normal.    EFW 2053 g (78% AC 99%)  BPP     ASSESSMENT/COUNSELIN y.o.   at  30 5/7 weeks gestation (Estimated Date of Delivery: None noted.).     -Pregnancy  [ X ] stable  [   ] improving [  ] worsening    Diagnoses and all orders for this visit:    1. Antepartum multigravida of advanced  maternal age (Primary)    2. [place] product of IVF pregnancy    3. Placenta previa antepartum    4. Placenta accreta in second trimester             IVF Pregnancy:  Previously counseled: This patient's pregnancy was conceived via IVF.  We discussed that the risk of birth defects in pregnancies conceived through IVF or ICSI is slightly higher than that for naturally conceived pregnancies. The risk for birth defects for IVF and ICSI pregnancies has ranged from 5-8%.   The most common birth defects in pregnanices conceived with IVF-ICSI are cardiac malformations and genitourinary malformations.  The risk of birth defects from autologous and donor oocytes does not differ.    There appears to be a small increased risk of uteroplacental insufficiency (FGR,  Pre-eclampsia) and abnormal placentation (Accreta spectrum) in IVF pregnancies.  The risk of gestational diabetes is also increased slightly.     Thus, a fetal ECHO is indicated to further evaluate for cardiac malformation.  Serial growth exams are indicated.      Advanced Maternal Age  Previously counseled: Noninvasive prenatal screening with cell-free fetal DNA (NIPT) results reviewed, which were normal.    Advanced maternal age has been associated with placental insufficiency with increased risk of fetal growth disorder and hypertensive disorders. Recommend fetal growth surveillance. Start  fetal surveillance with weekly BPP at 32 weeks.     Recommend baby ASA, which she is taking.      Placenta previa  Per Sar[facility] no accreta on MRI--records viewed.    Previous counseling: A morbidly adherent placenta is a general term that includes placenta accreta, increta, and percreta.  The most important risk factor for placenta accreta is placenta previa after a prior  delivery--she had a previous vaginal delivery but has had 2 D&C's and an IVF pregnancy. The abnormally implanted placenta is often diagnosed on prenatal sonographic evaluation of the placenta  in a woman with risk factors for accreta (previa, previous  delivery), but may be an incidental finding.      Signs of placenta accreta include: uterine bulge, bladder wall interruption, focal exophytic mass, asymmetric thickening of the placenta, loss of hypotintense interface, myometrial thinning, abnormal vascularization, placental lakes, etc.  She has many of these on ultrasound today with a complete previa and hypervaculature.  I suspect an accreta, but it is always difficult to tell for sure invasion on US.        Although we were initially concerned about placenta accreta spectrum, her follow up imaging including MRI has been more reassuring that this is not the case.  Certainly, we cannot rule out a small focal accreta on any amount of imaging.  I discussed this with her--the dilemma of needing to make sure we keep her safe but also make sure we are not overcalling it/destining her to a hysterectomy unnecessarily.  I would recommend delivery around 36 weeks for placenta previa.  Extra precautions should be considered includin large bore IV's, anesthesia consult/extra anesthesia support, main OR delivery,  PRBCs/FFP/Platlets immediately available in OR, cell saver.  I would recommend steroids in the week leading up to delivery at primary OB office.      I will leave to Dr. Gilbert whether she wants to get gyn oncology involved/aware given the lack of concern on MRI for placenta accreta.      We discussed being off work.   Discussed pelvic rest, lifting precautions, etc.  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  Discussed up to her and her stress level at work--she could even consider a hybrid situation and do a couple days a week at work but work at home some of the time--she will think about this and may transition fully to home in the future.      She asked about a vasa previa--we went over her images. There is certainly placenta between the cord insertion  and cervix and there is no point where free fetal vessels traverse the cervix, so I have a low suspicion for this at this point.  The placenta does thin out near the cervix and appers to be bilobed, but there are no vessels in this membrane--the cord insertion is in the placenta proper more distally.      She had a normal fetal ECHo at [facility] it sounds like.       Fetal arrhythmia:  Not seen today    Summary of Plan  -S/p MRI: see delivery planning above--will leave to Dr. Gilbert if she wants to engage gyn oncology given lack of concern for accreta on MRI at [facility].    -Starting at 32 weeks: Weekly fetal  surveillance until delivery at primary OB  -Delivery 36th week for placenta previa with steroids preceding    Follow-up: 3-4 weeks care planning, look again at placenta    Thank you for the consult and opportunity to care for this patient.  Please feel free to reach out with any questions or concerns.      I spent 20 minutes caring for this patient on this date of service. This time includes time spent by me in the following activities: preparing for the visit, reviewing tests, obtaining and/or reviewing a separately obtained history, performing a medically appropriate examination and/or evaluation, counseling and educating the patient/family/caregiver and independently interpreting results and communicating that information with the patient/family/caregiver with greater than 50% spent in counseling and coordination of care.       I spent 5 minutes on the separately reported service of US imaging not included in the time used to support the E/M service also reported today.      Cathy Watson MD FACOG  Maternal Fetal Medicine-[facility]  Office: [phone]  [email or web address]